# Patient Record
Sex: MALE | Race: WHITE | NOT HISPANIC OR LATINO | URBAN - METROPOLITAN AREA
[De-identification: names, ages, dates, MRNs, and addresses within clinical notes are randomized per-mention and may not be internally consistent; named-entity substitution may affect disease eponyms.]

---

## 2023-02-03 ENCOUNTER — INPATIENT (INPATIENT)
Facility: HOSPITAL | Age: 78
LOS: 5 days | Discharge: HOME | DRG: 392 | End: 2023-02-09
Attending: STUDENT IN AN ORGANIZED HEALTH CARE EDUCATION/TRAINING PROGRAM | Admitting: INTERNAL MEDICINE
Payer: MEDICARE

## 2023-02-03 VITALS
SYSTOLIC BLOOD PRESSURE: 141 MMHG | TEMPERATURE: 100 F | RESPIRATION RATE: 19 BRPM | OXYGEN SATURATION: 95 % | WEIGHT: 210.1 LBS | DIASTOLIC BLOOD PRESSURE: 84 MMHG | HEART RATE: 89 BPM | HEIGHT: 73 IN

## 2023-02-03 DIAGNOSIS — R62.50 UNSPECIFIED LACK OF EXPECTED NORMAL PHYSIOLOGICAL DEVELOPMENT IN CHILDHOOD: ICD-10-CM

## 2023-02-03 DIAGNOSIS — E87.20 ACIDOSIS, UNSPECIFIED: ICD-10-CM

## 2023-02-03 DIAGNOSIS — J45.909 UNSPECIFIED ASTHMA, UNCOMPLICATED: ICD-10-CM

## 2023-02-03 DIAGNOSIS — Z29.9 ENCOUNTER FOR PROPHYLACTIC MEASURES, UNSPECIFIED: ICD-10-CM

## 2023-02-03 DIAGNOSIS — R93.89 ABNORMAL FINDINGS ON DIAGNOSTIC IMAGING OF OTHER SPECIFIED BODY STRUCTURES: ICD-10-CM

## 2023-02-03 DIAGNOSIS — N40.0 BENIGN PROSTATIC HYPERPLASIA WITHOUT LOWER URINARY TRACT SYMPTOMS: ICD-10-CM

## 2023-02-03 DIAGNOSIS — E87.6 HYPOKALEMIA: ICD-10-CM

## 2023-02-03 DIAGNOSIS — K29.70 GASTRITIS, UNSPECIFIED, WITHOUT BLEEDING: ICD-10-CM

## 2023-02-03 LAB
ADV 40+41 DNA STL QL NAA+NON-PROBE: SIGNIFICANT CHANGE UP
ALBUMIN SERPL ELPH-MCNC: 3.6 G/DL — SIGNIFICANT CHANGE UP (ref 3.3–5)
ALP SERPL-CCNC: 72 U/L — SIGNIFICANT CHANGE UP (ref 40–120)
ALT FLD-CCNC: 16 U/L — SIGNIFICANT CHANGE UP (ref 10–45)
ANION GAP SERPL CALC-SCNC: 14 MMOL/L — SIGNIFICANT CHANGE UP (ref 5–17)
ANION GAP SERPL CALC-SCNC: 6 MMOL/L — SIGNIFICANT CHANGE UP (ref 5–17)
APTT BLD: 29.3 SEC — SIGNIFICANT CHANGE UP (ref 27.5–35.5)
AST SERPL-CCNC: 19 U/L — SIGNIFICANT CHANGE UP (ref 10–40)
ASTROVIRUS: SIGNIFICANT CHANGE UP
BASE EXCESS BLDV CALC-SCNC: -3.3 MMOL/L — LOW (ref -2–3)
BASOPHILS # BLD AUTO: 0 K/UL — SIGNIFICANT CHANGE UP (ref 0–0.2)
BASOPHILS NFR BLD AUTO: 0 % — SIGNIFICANT CHANGE UP (ref 0–2)
BILIRUB SERPL-MCNC: 0.6 MG/DL — SIGNIFICANT CHANGE UP (ref 0.2–1.2)
BLD GP AB SCN SERPL QL: NEGATIVE — SIGNIFICANT CHANGE UP
BUN SERPL-MCNC: 27 MG/DL — HIGH (ref 7–23)
BUN SERPL-MCNC: 33 MG/DL — HIGH (ref 7–23)
C CAYETANENSIS DNA STL QL NAA+NON-PROBE: SIGNIFICANT CHANGE UP
C DIFF GDH STL QL: NEGATIVE — SIGNIFICANT CHANGE UP
C DIFF GDH STL QL: SIGNIFICANT CHANGE UP
CA-I SERPL-SCNC: 1.07 MMOL/L — LOW (ref 1.15–1.33)
CALCIUM SERPL-MCNC: 7.4 MG/DL — LOW (ref 8.4–10.5)
CALCIUM SERPL-MCNC: 8.1 MG/DL — LOW (ref 8.4–10.5)
CAMPYLOBACTER DNA SPEC NAA+PROBE: SIGNIFICANT CHANGE UP
CHLORIDE SERPL-SCNC: 101 MMOL/L — SIGNIFICANT CHANGE UP (ref 96–108)
CHLORIDE SERPL-SCNC: 103 MMOL/L — SIGNIFICANT CHANGE UP (ref 96–108)
CO2 BLDV-SCNC: 22.4 MMOL/L — SIGNIFICANT CHANGE UP (ref 22–26)
CO2 SERPL-SCNC: 20 MMOL/L — LOW (ref 22–31)
CO2 SERPL-SCNC: 24 MMOL/L — SIGNIFICANT CHANGE UP (ref 22–31)
CREAT SERPL-MCNC: 0.71 MG/DL — SIGNIFICANT CHANGE UP (ref 0.5–1.3)
CREAT SERPL-MCNC: 0.92 MG/DL — SIGNIFICANT CHANGE UP (ref 0.5–1.3)
CRYPTOSP DNA STL QL NAA+PROBE: SIGNIFICANT CHANGE UP
E COLI SXT SPEC: SIGNIFICANT CHANGE UP
E HISTOLYT DNA STL QL NAA+NON-PROBE: SIGNIFICANT CHANGE UP
EC EAEA GENE STL QL NAA+PROBE: SIGNIFICANT CHANGE UP
EGFR: 85 ML/MIN/1.73M2 — SIGNIFICANT CHANGE UP
EGFR: 94 ML/MIN/1.73M2 — SIGNIFICANT CHANGE UP
EOSINOPHIL # BLD AUTO: 0 K/UL — SIGNIFICANT CHANGE UP (ref 0–0.5)
EOSINOPHIL NFR BLD AUTO: 0 % — SIGNIFICANT CHANGE UP (ref 0–6)
EPEC DNA STL QL NAA+PROBE: SIGNIFICANT CHANGE UP
ETEC DNA STL QL NAA+PROBE: SIGNIFICANT CHANGE UP
FLUAV AG NPH QL: SIGNIFICANT CHANGE UP
FLUBV AG NPH QL: SIGNIFICANT CHANGE UP
G LAMBLIA DNA STL QL NAA+NON-PROBE: SIGNIFICANT CHANGE UP
GAS PNL BLDV: 134 MMOL/L — LOW (ref 136–145)
GAS PNL BLDV: SIGNIFICANT CHANGE UP
GI PCR PANEL: DETECTED
GIANT PLATELETS BLD QL SMEAR: PRESENT — SIGNIFICANT CHANGE UP
GLUCOSE SERPL-MCNC: 104 MG/DL — HIGH (ref 70–99)
GLUCOSE SERPL-MCNC: 188 MG/DL — HIGH (ref 70–99)
HCO3 BLDV-SCNC: 21 MMOL/L — LOW (ref 22–29)
HCT VFR BLD CALC: 40.7 % — SIGNIFICANT CHANGE UP (ref 39–50)
HGB BLD-MCNC: 14.1 G/DL — SIGNIFICANT CHANGE UP (ref 13–17)
INR BLD: 1.19 — HIGH (ref 0.88–1.16)
LACTATE SERPL-SCNC: 2 MMOL/L — SIGNIFICANT CHANGE UP (ref 0.5–2)
LIDOCAIN IGE QN: 13 U/L — SIGNIFICANT CHANGE UP (ref 7–60)
LYMPHOCYTES # BLD AUTO: 0.12 K/UL — LOW (ref 1–3.3)
LYMPHOCYTES # BLD AUTO: 2.6 % — LOW (ref 13–44)
MAGNESIUM SERPL-MCNC: 1.7 MG/DL — SIGNIFICANT CHANGE UP (ref 1.6–2.6)
MANUAL SMEAR VERIFICATION: SIGNIFICANT CHANGE UP
MCHC RBC-ENTMCNC: 33.8 PG — SIGNIFICANT CHANGE UP (ref 27–34)
MCHC RBC-ENTMCNC: 34.6 GM/DL — SIGNIFICANT CHANGE UP (ref 32–36)
MCV RBC AUTO: 97.6 FL — SIGNIFICANT CHANGE UP (ref 80–100)
MONOCYTES # BLD AUTO: 0.08 K/UL — SIGNIFICANT CHANGE UP (ref 0–0.9)
MONOCYTES NFR BLD AUTO: 1.8 % — LOW (ref 2–14)
NEUTROPHILS # BLD AUTO: 4.38 K/UL — SIGNIFICANT CHANGE UP (ref 1.8–7.4)
NEUTROPHILS NFR BLD AUTO: 84.2 % — HIGH (ref 43–77)
NEUTS BAND # BLD: 9.6 % — HIGH (ref 0–8)
NOROVIRUS GI+II RNA STL QL NAA+NON-PROBE: DETECTED
NT-PROBNP SERPL-SCNC: 478 PG/ML — HIGH (ref 0–300)
P SHIGELLOIDES DNA STL QL NAA+NON-PROBE: SIGNIFICANT CHANGE UP
PCO2 BLDV: 36 MMHG — LOW (ref 42–55)
PH BLDV: 7.38 — SIGNIFICANT CHANGE UP (ref 7.32–7.43)
PLAT MORPH BLD: NORMAL — SIGNIFICANT CHANGE UP
PLATELET # BLD AUTO: 163 K/UL — SIGNIFICANT CHANGE UP (ref 150–400)
PO2 BLDV: 50 MMHG — HIGH (ref 25–45)
POTASSIUM BLDV-SCNC: 2.9 MMOL/L — CRITICAL LOW (ref 3.5–5.1)
POTASSIUM SERPL-MCNC: 3 MMOL/L — LOW (ref 3.5–5.3)
POTASSIUM SERPL-MCNC: 3.2 MMOL/L — LOW (ref 3.5–5.3)
POTASSIUM SERPL-SCNC: 3 MMOL/L — LOW (ref 3.5–5.3)
POTASSIUM SERPL-SCNC: 3.2 MMOL/L — LOW (ref 3.5–5.3)
PROT SERPL-MCNC: 7.3 G/DL — SIGNIFICANT CHANGE UP (ref 6–8.3)
PROTHROM AB SERPL-ACNC: 14.2 SEC — HIGH (ref 10.5–13.4)
RAPID RVP RESULT: SIGNIFICANT CHANGE UP
RBC # BLD: 4.17 M/UL — LOW (ref 4.2–5.8)
RBC # FLD: 12.8 % — SIGNIFICANT CHANGE UP (ref 10.3–14.5)
RBC BLD AUTO: NORMAL — SIGNIFICANT CHANGE UP
RH IG SCN BLD-IMP: POSITIVE — SIGNIFICANT CHANGE UP
RSV RNA NPH QL NAA+NON-PROBE: SIGNIFICANT CHANGE UP
RV RNA STL NAA+PROBE: SIGNIFICANT CHANGE UP
SALMONELLA DNA STL QL NAA+PROBE: SIGNIFICANT CHANGE UP
SAO2 % BLDV: 78 % — SIGNIFICANT CHANGE UP (ref 67–88)
SAPOVIRUS (GENOGROUPS I, II, IV, AND V): SIGNIFICANT CHANGE UP
SARS-COV-2 RNA SPEC QL NAA+PROBE: SIGNIFICANT CHANGE UP
SARS-COV-2 RNA SPEC QL NAA+PROBE: SIGNIFICANT CHANGE UP
SHIGELLA DNA SPEC QL NAA+PROBE: SIGNIFICANT CHANGE UP
SODIUM SERPL-SCNC: 133 MMOL/L — LOW (ref 135–145)
SODIUM SERPL-SCNC: 135 MMOL/L — SIGNIFICANT CHANGE UP (ref 135–145)
TROPONIN T SERPL-MCNC: <0.01 NG/ML — SIGNIFICANT CHANGE UP (ref 0–0.01)
VARIANT LYMPHS # BLD: 1.8 % — SIGNIFICANT CHANGE UP (ref 0–6)
VIBRIO CHOL TOXIN CTXA STL QL NAA+PROBE: SIGNIFICANT CHANGE UP
VIBRIO CHOL TOXIN CTXA STL QL NAA+PROBE: SIGNIFICANT CHANGE UP
WBC # BLD: 4.67 K/UL — SIGNIFICANT CHANGE UP (ref 3.8–10.5)
WBC # FLD AUTO: 4.67 K/UL — SIGNIFICANT CHANGE UP (ref 3.8–10.5)
Y ENTEROCOL DNA STL QL NAA+NON-PROBE: SIGNIFICANT CHANGE UP

## 2023-02-03 PROCEDURE — 71260 CT THORAX DX C+: CPT | Mod: 26

## 2023-02-03 PROCEDURE — 71045 X-RAY EXAM CHEST 1 VIEW: CPT | Mod: 26

## 2023-02-03 PROCEDURE — 99223 1ST HOSP IP/OBS HIGH 75: CPT

## 2023-02-03 PROCEDURE — 99222 1ST HOSP IP/OBS MODERATE 55: CPT | Mod: GC

## 2023-02-03 PROCEDURE — 99285 EMERGENCY DEPT VISIT HI MDM: CPT

## 2023-02-03 PROCEDURE — 74177 CT ABD & PELVIS W/CONTRAST: CPT | Mod: 26,MA

## 2023-02-03 RX ORDER — MULTIVIT-MIN/FERROUS GLUCONATE 9 MG/15 ML
1 LIQUID (ML) ORAL EVERY 24 HOURS
Refills: 0 | Status: DISCONTINUED | OUTPATIENT
Start: 2023-02-03 | End: 2023-02-09

## 2023-02-03 RX ORDER — DIATRIZOATE MEGLUMINE 180 MG/ML
30 INJECTION, SOLUTION INTRAVESICAL ONCE
Refills: 0 | Status: COMPLETED | OUTPATIENT
Start: 2023-02-03 | End: 2023-02-03

## 2023-02-03 RX ORDER — ALBUTEROL 90 UG/1
2 AEROSOL, METERED ORAL EVERY 6 HOURS
Refills: 0 | Status: DISCONTINUED | OUTPATIENT
Start: 2023-02-03 | End: 2023-02-09

## 2023-02-03 RX ORDER — ONDANSETRON 8 MG/1
4 TABLET, FILM COATED ORAL ONCE
Refills: 0 | Status: COMPLETED | OUTPATIENT
Start: 2023-02-03 | End: 2023-02-03

## 2023-02-03 RX ORDER — SODIUM CHLORIDE 9 MG/ML
1000 INJECTION INTRAMUSCULAR; INTRAVENOUS; SUBCUTANEOUS ONCE
Refills: 0 | Status: COMPLETED | OUTPATIENT
Start: 2023-02-03 | End: 2023-02-03

## 2023-02-03 RX ORDER — TRAZODONE HCL 50 MG
50 TABLET ORAL AT BEDTIME
Refills: 0 | Status: DISCONTINUED | OUTPATIENT
Start: 2023-02-03 | End: 2023-02-09

## 2023-02-03 RX ORDER — SODIUM CHLORIDE 9 MG/ML
1000 INJECTION, SOLUTION INTRAVENOUS
Refills: 0 | Status: DISCONTINUED | OUTPATIENT
Start: 2023-02-03 | End: 2023-02-04

## 2023-02-03 RX ORDER — MONTELUKAST 4 MG/1
10 TABLET, CHEWABLE ORAL EVERY 24 HOURS
Refills: 0 | Status: DISCONTINUED | OUTPATIENT
Start: 2023-02-03 | End: 2023-02-09

## 2023-02-03 RX ORDER — ASPIRIN/CALCIUM CARB/MAGNESIUM 324 MG
81 TABLET ORAL DAILY
Refills: 0 | Status: DISCONTINUED | OUTPATIENT
Start: 2023-02-03 | End: 2023-02-09

## 2023-02-03 RX ORDER — TAMSULOSIN HYDROCHLORIDE 0.4 MG/1
0.4 CAPSULE ORAL AT BEDTIME
Refills: 0 | Status: DISCONTINUED | OUTPATIENT
Start: 2023-02-03 | End: 2023-02-09

## 2023-02-03 RX ORDER — LANOLIN ALCOHOL/MO/W.PET/CERES
3 CREAM (GRAM) TOPICAL AT BEDTIME
Refills: 0 | Status: DISCONTINUED | OUTPATIENT
Start: 2023-02-03 | End: 2023-02-03

## 2023-02-03 RX ORDER — ONDANSETRON 8 MG/1
4 TABLET, FILM COATED ORAL EVERY 8 HOURS
Refills: 0 | Status: DISCONTINUED | OUTPATIENT
Start: 2023-02-03 | End: 2023-02-03

## 2023-02-03 RX ORDER — FLUTICASONE PROPIONATE 50 MCG
1 SPRAY, SUSPENSION NASAL EVERY 12 HOURS
Refills: 0 | Status: DISCONTINUED | OUTPATIENT
Start: 2023-02-03 | End: 2023-02-09

## 2023-02-03 RX ORDER — CHLORHEXIDINE GLUCONATE 213 G/1000ML
15 SOLUTION TOPICAL
Refills: 0 | Status: DISCONTINUED | OUTPATIENT
Start: 2023-02-03 | End: 2023-02-09

## 2023-02-03 RX ORDER — POLYETHYLENE GLYCOL 3350 17 G/17G
17 POWDER, FOR SOLUTION ORAL EVERY 24 HOURS
Refills: 0 | Status: DISCONTINUED | OUTPATIENT
Start: 2023-02-03 | End: 2023-02-03

## 2023-02-03 RX ORDER — POTASSIUM CHLORIDE 20 MEQ
40 PACKET (EA) ORAL EVERY 4 HOURS
Refills: 0 | Status: COMPLETED | OUTPATIENT
Start: 2023-02-03 | End: 2023-02-03

## 2023-02-03 RX ORDER — FLUPHENAZINE HYDROCHLORIDE 1 MG/1
2.5 TABLET, FILM COATED ORAL EVERY 12 HOURS
Refills: 0 | Status: DISCONTINUED | OUTPATIENT
Start: 2023-02-03 | End: 2023-02-09

## 2023-02-03 RX ORDER — POTASSIUM CHLORIDE 20 MEQ
40 PACKET (EA) ORAL ONCE
Refills: 0 | Status: COMPLETED | OUTPATIENT
Start: 2023-02-03 | End: 2023-02-03

## 2023-02-03 RX ORDER — ENOXAPARIN SODIUM 100 MG/ML
40 INJECTION SUBCUTANEOUS EVERY 24 HOURS
Refills: 0 | Status: DISCONTINUED | OUTPATIENT
Start: 2023-02-03 | End: 2023-02-09

## 2023-02-03 RX ORDER — ACETAMINOPHEN 500 MG
650 TABLET ORAL EVERY 6 HOURS
Refills: 0 | Status: DISCONTINUED | OUTPATIENT
Start: 2023-02-03 | End: 2023-02-09

## 2023-02-03 RX ORDER — SENNA PLUS 8.6 MG/1
2 TABLET ORAL AT BEDTIME
Refills: 0 | Status: DISCONTINUED | OUTPATIENT
Start: 2023-02-03 | End: 2023-02-03

## 2023-02-03 RX ORDER — POTASSIUM CHLORIDE 20 MEQ
10 PACKET (EA) ORAL
Refills: 0 | Status: COMPLETED | OUTPATIENT
Start: 2023-02-03 | End: 2023-02-03

## 2023-02-03 RX ORDER — MAGNESIUM SULFATE 500 MG/ML
2 VIAL (ML) INJECTION ONCE
Refills: 0 | Status: COMPLETED | OUTPATIENT
Start: 2023-02-03 | End: 2023-02-03

## 2023-02-03 RX ORDER — FINASTERIDE 5 MG/1
5 TABLET, FILM COATED ORAL EVERY 24 HOURS
Refills: 0 | Status: DISCONTINUED | OUTPATIENT
Start: 2023-02-03 | End: 2023-02-09

## 2023-02-03 RX ADMIN — Medication 10 MILLIEQUIVALENT(S): at 06:30

## 2023-02-03 RX ADMIN — Medication 50 MILLIGRAM(S): at 22:40

## 2023-02-03 RX ADMIN — FLUPHENAZINE HYDROCHLORIDE 2.5 MILLIGRAM(S): 1 TABLET, FILM COATED ORAL at 10:02

## 2023-02-03 RX ADMIN — Medication 25 GRAM(S): at 05:10

## 2023-02-03 RX ADMIN — FINASTERIDE 5 MILLIGRAM(S): 5 TABLET, FILM COATED ORAL at 10:03

## 2023-02-03 RX ADMIN — Medication 100 MILLIEQUIVALENT(S): at 07:49

## 2023-02-03 RX ADMIN — Medication 1 SPRAY(S): at 10:03

## 2023-02-03 RX ADMIN — Medication 1 DROP(S): at 10:03

## 2023-02-03 RX ADMIN — FLUPHENAZINE HYDROCHLORIDE 2.5 MILLIGRAM(S): 1 TABLET, FILM COATED ORAL at 22:40

## 2023-02-03 RX ADMIN — SODIUM CHLORIDE 80 MILLILITER(S): 9 INJECTION, SOLUTION INTRAVENOUS at 10:04

## 2023-02-03 RX ADMIN — TAMSULOSIN HYDROCHLORIDE 0.4 MILLIGRAM(S): 0.4 CAPSULE ORAL at 22:40

## 2023-02-03 RX ADMIN — Medication 81 MILLIGRAM(S): at 13:20

## 2023-02-03 RX ADMIN — Medication 100 MILLIEQUIVALENT(S): at 06:30

## 2023-02-03 RX ADMIN — Medication 1 DROP(S): at 22:40

## 2023-02-03 RX ADMIN — Medication 40 MILLIEQUIVALENT(S): at 22:40

## 2023-02-03 RX ADMIN — CHLORHEXIDINE GLUCONATE 15 MILLILITER(S): 213 SOLUTION TOPICAL at 17:09

## 2023-02-03 RX ADMIN — Medication 1 SPRAY(S): at 22:40

## 2023-02-03 RX ADMIN — Medication 100 MILLIEQUIVALENT(S): at 05:10

## 2023-02-03 RX ADMIN — MONTELUKAST 10 MILLIGRAM(S): 4 TABLET, CHEWABLE ORAL at 10:03

## 2023-02-03 RX ADMIN — SODIUM CHLORIDE 1000 MILLILITER(S): 9 INJECTION INTRAMUSCULAR; INTRAVENOUS; SUBCUTANEOUS at 05:10

## 2023-02-03 RX ADMIN — Medication 40 MILLIEQUIVALENT(S): at 05:10

## 2023-02-03 RX ADMIN — Medication 40 MILLIEQUIVALENT(S): at 17:09

## 2023-02-03 RX ADMIN — Medication 1 TABLET(S): at 10:03

## 2023-02-03 RX ADMIN — SODIUM CHLORIDE 1000 MILLILITER(S): 9 INJECTION INTRAMUSCULAR; INTRAVENOUS; SUBCUTANEOUS at 04:28

## 2023-02-03 RX ADMIN — ENOXAPARIN SODIUM 40 MILLIGRAM(S): 100 INJECTION SUBCUTANEOUS at 10:03

## 2023-02-03 RX ADMIN — ONDANSETRON 4 MILLIGRAM(S): 8 TABLET, FILM COATED ORAL at 04:28

## 2023-02-03 RX ADMIN — DIATRIZOATE MEGLUMINE 30 MILLILITER(S): 180 INJECTION, SOLUTION INTRAVESICAL at 04:28

## 2023-02-03 NOTE — ED ADULT NURSE REASSESSMENT NOTE - NS ED NURSE REASSESS COMMENT FT1
Pt cleaned x 2 by RN's. GI at bedside, stool sample collected.
Pt noted to be soiled, cleaned by PCA's prior to transfer to floor.
Received report from FERN Estrada. Received patient in stretcher. AOX0. Vital signs as noted in flowsheet.  Nonverbal s/sx of pain/discomfort not noted. Plan of care discussed and verbalized understanding. All needs attended. Fall risk precautions maintained. Purposeful proactive hourly rounding in progress.

## 2023-02-03 NOTE — H&P ADULT - HISTORY OF PRESENT ILLNESS
77M hx asthma, developmental delay (nonverbal at baseline), BPH, glaucoma, BIBEMS for nonbloody diarrhea, vomiting, and respiratory distress since yesterday.  EMS reports upon their arrival he was 89% on RA, 92% on NC. placed pt on NRB. Per pt's escort from facility, symptoms began yesterday. States that 2 other people at facility have similar symptoms. Escort states that at baseline pt is unable to communicate pain to staff.  ROS otherwise unobtainable due to mental status but patient appears comfortable, breathing without accessory muscle use or cough.    ED course:  Vitals: T 99.9 F, HR 89, /84, rr 19, SpO2 95% on NRB -->97% on 4L NC  Labs: CBC wnl with 9.6% band neutrophils, PTT 14.2, INR 1.19, K 3.0, HCO3 20, BUN 33, glucose 188, Ca 8.1  Lactate 2.0, lipase 13, Mg 1.7, Trop <0.01,   VBG: K 2.9, Na 134, Ca 1.07, pCO2 36, pO2 50, HCO3 21, O2 sat 78%  COVID/Flu/RSV negative  CT A/P w oral and IV con prelim report shows: small hiatal hernia, suspicious for cecal carcinoma, ileus with findings c/w history of diarrhea.  Interventions: Zofran 4mg x1, MgSO4 2g x1, KCl 40 mEq po & 30 mEq IV, NS 2L bolus    Patient admitted to Medicine for further management. 77M hx asthma, developmental delay (nonverbal at baseline), BPH, glaucoma, BIBEMS for nonbloody diarrhea, vomiting, and respiratory distress since yesterday.  EMS reports upon their arrival he was 89% on RA, 92% on NC. placed pt on NRB. Per pt's escort from facility, symptoms began yesterday. States that 2 other people at facility have similar symptoms. Escort states that at baseline pt is unable to communicate pain to staff.  ROS otherwise unobtainable due to mental status but patient appears comfortable, breathing without accessory muscle use or cough.    ED course:  Vitals: T 99.9 F, HR 89, /84, rr 19, SpO2 95% on NRB -->97% on 4L NC  Labs: CBC wnl with 9.6% band neutrophils, PTT 14.2, INR 1.19, K 3.0, HCO3 20, BUN 33, glucose 188, Ca 8.1  Lactate 2.0, lipase 13, Mg 1.7, Trop <0.01,   VBG: K 2.9, Na 134, Ca 1.07, pCO2 36, pO2 50, HCO3 21, O2 sat 78%  COVID/Flu/RSV negative  ECG: NSR@92 bpm, normal axis, QTc 437, no ST-T changes  CT A/P w oral and IV con prelim report shows: small hiatal hernia, suspicious for cecal carcinoma, ileus with findings c/w history of diarrhea.  Interventions: Zofran 4mg x1, MgSO4 2g x1, KCl 40 mEq po & 30 mEq IV, NS 2L bolus    Patient admitted to Medicine for further management.

## 2023-02-03 NOTE — H&P ADULT - NSHPSOCIALHISTORY_GEN_ALL_CORE
Per escort, no tobacco, alcohol or drug use. Ambulates without assistive devices at baseline but hasn't been walking since his episodes of diarrhea last night.  Lives at [facility].

## 2023-02-03 NOTE — H&P ADULT - PROBLEM SELECTOR PLAN 3
Patient with O2 requirements on admission, initially requiring 15L NRB and weaned to 4L NC. Patient weaned to room air and satting 95%. No wheezing noted on exam.  - continue home fluticasone nasal 50 mcg qd  - albuterol HFA q6h prn for SOB/wheezing

## 2023-02-03 NOTE — H&P ADULT - PROBLEM SELECTOR PLAN 1
Patient with 1 day of nonbloody vomiting and diarrhea, history of similar symptoms with group home residents. Patient not meeting sepsis criteria.  CT A/P showed ileus with findings c/w history of diarrhea.  - f/u GI PCR  - f/u blood cultures Patient with 1 day of nonbloody vomiting and diarrhea, history of similar symptoms with group home residents. Patient not meeting sepsis criteria.  CT A/P showed ileus with findings c/w history of diarrhea. CT with significant stool burden.  - f/u GI PCR  - f/u blood cultures  - bowel regimen

## 2023-02-03 NOTE — H&P ADULT - PROBLEM SELECTOR PLAN 8
F: s/p NS 2L, LR @80/hr x6 hours  E: replenish K<4, Mg<2  N: Regular    VTE Prophylaxis: Lovenox 40 Q24H  GI: not needed  C: Full Code  D: SILVER

## 2023-02-03 NOTE — ED PROVIDER NOTE - NSFOLLOWUPINSTRUCTIONS_ED_ALL_ED_FT
Nausea and Vomiting, Adult    Nausea is feeling that you have an upset stomach and that you are about to vomit. Vomiting is when food in your stomach forcefully comes out of your mouth. Vomiting can make you feel weak. If you vomit, or if you are not able to drink enough fluids, you may not have enough water in your body (get dehydrated). If you do not have enough water in your body, you may:  •Feel tired.     •Feel thirsty.     •Have a dry mouth.    •Have cracked lips.    •Pee (urinate) less often.    Older adults and people with other diseases or a weak body defense system (immune system) are at higher risk for not having enough water in the body. If you feel like you may vomit or you vomit, it is important to follow instructions from your doctor about how to take care of yourself.    Follow these instructions at home:    Watch your symptoms for any changes. Tell your doctor about them.    Eating and drinking     •Take an ORS (oral rehydration solution). This is a drink that is sold at pharmacies and stores.    •Drink clear fluids in small amounts as you are able, such as:  •Water.    •Ice chips.    •Fruit juice that has water added (diluted fruit juice).    •Low-calorie sports drinks.    •Eat bland, easy-to-digest foods in small amounts as you are able, such as:  •Bananas.    •Applesauce.    •Rice.    •Low-fat (lean) meats.    •Toast.    •Crackers.    •Avoid drinking fluids that have a lot of sugar or caffeine in them. This includes energy drinks, sports drinks, and soda.    •Avoid alcohol.    •Avoid spicy or fatty foods.    General instructions     •Take over-the-counter and prescription medicines only as told by your doctor.    •Drink enough fluid to keep your pee (urine) pale yellow.    •Wash your hands often with soap and water for at least 20 seconds. If you cannot use soap and water, use hand .    •Make sure that everyone in your home washes their hands well and often.    •Rest at home until you feel better.    •Watch your condition for any changes.    •Take slow and deep breaths when you feel like you may vomit.    •Keep all follow-up visits.    Contact a doctor if:    •Your symptoms get worse.    •You have new symptoms.    •You have a fever.    •You cannot drink fluids without vomiting.    •You feel like you may vomit for more than 2 days.    •You feel light-headed or dizzy.    •You have a headache.    •You have muscle cramps.    •You have a rash.    •You have pain while peeing.    Get help right away if:    •You have pain in your chest, neck, arm, or jaw.    •You feel very weak or you faint.    •You vomit again and again.    •You have vomit that is bright red or looks like black coffee grounds.    •You have bloody or black poop (stools) or poop that looks like tar.    •You have a very bad headache, a stiff neck, or both.    •You have very bad pain, cramping, or bloating in your belly (abdomen).    •You have trouble breathing.    •You are breathing very quickly.    •Your heart is beating very quickly.    •Your skin feels cold and clammy.    •You feel confused.    •You have signs of losing too much water in your body, such as:  •Dark pee, very little pee, or no pee.    •Cracked lips.    •Dry mouth.    •Sunken eyes.    •Sleepiness.    •Weakness.    These symptoms may be an emergency. Get help right away. Call 911.   • Do not wait to see if the symptoms will go away.     • Do not drive yourself to the hospital.     Summary    •Nausea is feeling that you have an upset stomach and that you are about to vomit. Vomiting is when food in your stomach comes out of your mouth.    •Follow instructions from your doctor about eating and drinking.    •Take over-the-counter and prescription medicines only as told by your doctor.    •Contact your doctor if your symptoms get worse or you have new symptoms.    •Keep all follow-up visits.    This information is not intended to replace advice given to you by your health

## 2023-02-03 NOTE — H&P ADULT - NSHPPHYSICALEXAM_GEN_ALL_CORE
PHYSICAL EXAM:  Constitutional: Resting comfortably in bed; NAD  Head: NC/AT  Eyes: PERRL, EOMI, anicteric sclera  ENT: no nasal discharge; uvula midline; dry mucous membranes  Neck: supple; no JVD or thyromegaly  Respiratory: CTA B/L; no W/R/R, no retractions  Cardiac: +S1/S2; RRR; no M/R/G  Gastrointestinal: abdomen soft, NTND; +BSx4  Genitourinary: normal external genitalia, condom cath noted draining clear yellow urine  Extremities: WWP, no clubbing or cyanosis; no peripheral edema  Musculoskeletal: NROM x4; no joint swelling, tenderness or erythema  Vascular: 2+ radial, DP/PT pulses B/L  Dermatologic: skin warm, dry and intact; mild excoriations noted on arms and legs (patient reportedly scratches self)  Lymphatic: no submandibular or cervical LAD  Neurologic: AAOx0; CNII-XII grossly intact; no focal deficits  Psychiatric: nonverbal, follows simple commands, compliant with exam

## 2023-02-03 NOTE — H&P ADULT - PROBLEM SELECTOR PLAN 6
Admission K 3.0. ECG with no T wave changes, no U waves. s/p KCl 40 mEq po & 30 mEq IV in ED  - trend BMP    #hypomagnesemia  Admission Mg 1.7. s/p MgSO4 2g x1  - trend BMP

## 2023-02-03 NOTE — ED PROVIDER NOTE - CARE PLAN
Principal Discharge DX:	Nausea, vomiting and diarrhea  Secondary Diagnosis:	Hypokalemia   1 Principal Discharge DX:	Nausea, vomiting and diarrhea  Secondary Diagnosis:	Hypokalemia  Secondary Diagnosis:	Ileus

## 2023-02-03 NOTE — H&P ADULT - PROBLEM SELECTOR PLAN 4
Patient with chronic developmental delay, nonverbal at baseline, lives in group home for patients with developmental delay. Home medications trifluoperazine 5 mg po qd, trazodone 50 mg po qhs. Follows simple commands.  - continue home meds Patient with chronic developmental delay, nonverbal at baseline, lives in group home for patients with developmental delay. Home medications trifluoperazine 5 mg po qd, trazodone 50 mg po qhs. Follows simple commands.  - continue home trazodone  - fluphenazine 2.5 mg po bid (therapeutic interchange)

## 2023-02-03 NOTE — PATIENT PROFILE ADULT - FALL HARM RISK - HARM RISK INTERVENTIONS

## 2023-02-03 NOTE — ED PROVIDER NOTE - PROGRESS NOTE DETAILS
will replete k and mg. pending CT results ileus on CT. concern for possible cecal carcinoma. escort unsure of who his health care proxy is  - will admit to medicine for  intervention to determine extent of intervention and care for possible CA will replete k and mg.

## 2023-02-03 NOTE — H&P ADULT - NSHPLABSRESULTS_GEN_ALL_CORE
LABS:                         14.1   4.67  )-----------( 163      ( 03 Feb 2023 04:24 )             40.7     02-03    135  |  101  |  33<H>  ----------------------------<  188<H>  3.0<L>   |  20<L>  |  0.92    Ca    8.1<L>      03 Feb 2023 04:24  Mg     1.7     02-03    TPro  7.3  /  Alb  3.6  /  TBili  0.6  /  DBili  x   /  AST  19  /  ALT  16  /  AlkPhos  72  02-03    PT/INR - ( 03 Feb 2023 04:24 )   PT: 14.2 sec;   INR: 1.19          PTT - ( 03 Feb 2023 04:24 )  PTT:29.3 sec    CARDIAC MARKERS ( 03 Feb 2023 04:24 )  x     / <0.01 ng/mL / x     / x     / x          Serum Pro-Brain Natriuretic Peptide: 478 pg/mL (02-03 @ 04:24)    Lactate, Blood: 2.0 mmol/L (02-03 @ 04:24)      RADIOLOGY, EKG & ADDITIONAL TESTS:

## 2023-02-03 NOTE — H&P ADULT - PROBLEM SELECTOR PLAN 5
Admission HCO3 20 with normal anion gap consistent with history of diarrhea. s/p 2L NS in ED. Dry mucous membranes on exam.  - gentle maintenance fluids  - encourage oral hydration  - trend BMP

## 2023-02-03 NOTE — ED PROVIDER NOTE - OBJECTIVE STATEMENT
77M hx asthma, developmental delay (nonverbal at baseline), BIBEMS for resp distress. per pt's escort from facility pt with nonbloody vomiting and diarrhea since yesterday. states 2 other people at facility with similar symptoms. tonight pt seemed to be having trouble breathing. no cough. EMS states upon their arrival he was 89% on RA, 92% on NC. placed pt on NRB. escort states pt unable to communicate pain to staff.

## 2023-02-03 NOTE — ED PROVIDER NOTE - CLINICAL SUMMARY MEDICAL DECISION MAKING FREE TEXT BOX
n/v/d, afebrile rectally, no resp distress currently, no wheezing. abd soft, no guarding on exam, no wincing with palpation.   -check labs  -ekg  -cxr  -CT a/p to evaluate for surgical pathology as pt with n/v/d and unable to communicate if had pain  -zofran, ivf  -cultures

## 2023-02-03 NOTE — H&P ADULT - ATTENDING COMMENTS
A/P:  1. N/V with SOB, likely viral etiology  2. hypokalemia due to GI losses from N/V  3. AGMA  4. incidental finding of cecal stricture    Stable conditions:  1. BPH  2. dementia related psychosis?    - labs reviewed, normal WBC but with neutrophil predominance and bands  - CT abdomen reviewed +liquid stool and cecal stricture  - obtain GI PCR  - replete electrolytes for goal potassium 4 and Mag 2. replete phos  - BRAT diet, low residue diet  - GI eval for cecal stricture -- likely will need outpatient colonoscopy. obtain collateral info re: prior colonoscopies  - continue finasteride and tamsulosin  - c/w fluphenazine and trazadone    rest of plan as above    plan for dc in next 24-48 hours if able to tolerate A/P:  1. N/V with SOB, likely viral etiology  2. hypokalemia due to GI losses from N/V  3. AGMA  4. incidental finding of cecal stricture    Stable conditions:  1. BPH  2. dementia related psychosis?  3. asthma, mild intermittent    - labs reviewed, normal WBC but with neutrophil predominance and bands  - CT abdomen reviewed +liquid stool and cecal stricture  - obtain GI PCR  - replete electrolytes for goal potassium 4 and Mag 2. replete phos  - BRAT diet, low residue diet  - GI eval for cecal stricture -- likely will need outpatient colonoscopy. obtain collateral info re: prior colonoscopies  - continue finasteride and tamsulosin  - c/w fluphenazine and trazadone    rest of plan as above    plan for dc in next 24-48 hours if able to tolerate po A/P:  1. N/V with SOB, likely viral etiology  2. hypokalemia due to GI losses from N/V  3. AGMA  4. incidental finding of cecal stricture    Stable conditions:  1. BPH  2. dementia related psychosis?  3. asthma, mild intermittent    - labs reviewed, normal WBC but with neutrophil predominance and bands  - CT abdomen reviewed +liquid stool and cecal stricture  - obtain GI PCR  - replete electrolytes for goal potassium 4 and Mag 2. replete phos  - BRAT diet, low residue diet  - GI eval for cecal stricture -- likely will need outpatient colonoscopy. obtain collateral info re: prior colonoscopies  - continue finasteride and tamsulosin  - c/w fluphenazine and trazadone    Edit: GI PCR + norovirus    rest of plan as above    plan for dc in next 24-48 hours if able to tolerate po

## 2023-02-03 NOTE — CONSULT NOTE ADULT - ASSESSMENT
77M hx  developmental delay (nonverbal at baseline) admitted for acute gastroenteritis without overt GIB incidentally found to have 4.5 cm long annular lesion involving the superior cecum concerning for malignancy    GI consulted given cecal lesion    #Cecal lesion:  No apparent acute GIB. Hgb nml.  Lesion appearance concerning for malignancy, though this is likely unrelated to admitting dx given what appears to be viral gastroenteritis outbreak at residential facility per collateral hx obtained    Recommendations:  -Obtain CEA  -Obtain CT chest to complete initial eval at time of recommended delayed CT abd/pelvis per radiology to assess for transit of contrast through small bowel  -If concern for obstruction, with worsening nausea/vomiting, consider NGT placement for decompression  -For now, diet per primary team pending above evaluation and tx of acute gastroenteritis  -Will plan to discuss concerns with pt's healthcare proxy to determine GOC and recommendation for colonoscopy for definitive eval and dx  -Defer colonoscopy for now in favor of medical mgmt of acute gastroenteritis though inpt evaluation warranted once clinically improved.    #Acute gastroenteritis:  Hemodynamically stable and afebrile. HypoK noted on admissions labs though nml renal function/liver enzymes.    Cdiff negative  GI PCR pending    Recommendations:  -F/u GI PCR  -Resuscitation/lyte replacement    Marcellus Husain DO  Gastroenterology Fellow  Pager: [phone]   77M hx  developmental delay (nonverbal at baseline) admitted for acute gastroenteritis without overt GIB incidentally found to have 4.5 cm long annular lesion involving the superior cecum concerning for malignancy    GI consulted given cecal lesion    #Cecal lesion:  No apparent acute GIB. Hgb nml.  Lesion appearance concerning for malignancy, though this is likely unrelated to admitting dx given what appears to be viral gastroenteritis outbreak at residential facility per collateral hx obtained    Recommendations:  -Obtain CEA  -Obtain CT chest to complete initial eval at time of recommended delayed CT abd/pelvis per radiology to assess for transit of contrast through small bowel  -If concern for obstruction, with worsening nausea/vomiting, consider NGT placement for decompression  -For now, diet per primary team pending above evaluation and tx of acute gastroenteritis  -Will plan to discuss concerns with pt's healthcare proxy to determine procedural consent in setting of recommendation for colonoscopy for definitive eval and dx  -Defer colonoscopy for now in favor of medical mgmt of acute gastroenteritis though inpt evaluation warranted once clinically improved.    #Acute gastroenteritis:  Hemodynamically stable and afebrile. HypoK noted on admissions labs though nml renal function/liver enzymes.    Cdiff negative  GI PCR pending    Recommendations:  -F/u GI PCR  -Resuscitation/lyte replacement    Marcellus Husain DO  Gastroenterology Fellow  Pager: [phone]   77M hx  developmental delay (nonverbal at baseline) admitted for acute gastroenteritis without overt GIB incidentally found to have 4.5 cm long annular lesion involving the superior cecum concerning for malignancy    GI consulted given cecal lesion    #Cecal lesion:  No apparent acute GIB. Hgb nml.  Lesion appearance concerning for malignancy, though this is likely unrelated to admitting dx given what appears to be viral gastroenteritis outbreak at residential facility per collateral hx obtained    Recommendations:  -Obtain CEA  -Obtain CT chest to complete initial eval at time of recommended delayed CT abd/pelvis per radiology to assess for transit of contrast through small bowel  -If concern for obstruction, with worsening nausea/vomiting, consider NGT placement for decompression  -For now, diet per primary team pending above evaluation and tx of acute gastroenteritis  -Will plan to discuss concerns with pt's healthcare proxy to determine procedural consent in setting of recommendation for colonoscopy for definitive eval and dx  -Defer colonoscopy for now in favor of medical mgmt of acute gastroenteritis though inpt evaluation warranted once clinically improved.    #Acute viral gastroenteritis:  Norovirus + on PCR  Hemodynamically stable and afebrile. HypoK noted on admissions labs though nml renal function/liver enzymes.    Cdiff negative    Recommendations:  -Resuscitation/lyte replacement  -Supportive care per primary team    Marcellus Husain DO  Gastroenterology Fellow  Pager: [phone]

## 2023-02-03 NOTE — H&P ADULT - ASSESSMENT
77M hx asthma, developmental delay (nonverbal at baseline), BPH, glaucoma, BIBEMS for nonbloody diarrhea, vomiting, and respiratory distress for 1 day, admitted for further management.

## 2023-02-03 NOTE — H&P ADULT - NSICDXPASTMEDICALHX_GEN_ALL_CORE_FT
PAST MEDICAL HISTORY:  Asthma     BPH (benign prostatic hyperplasia)     Glaucoma     History of developmental disability

## 2023-02-03 NOTE — ED ADULT TRIAGE NOTE - ARRIVAL INFO ADDITIONAL COMMENTS
Pt to ED c/o N/V/DX 2 days as well as SOB X3 hours. Non bloody stool/ emesis. HX of asthma. Per EMS 89 on room air. 95 on non rebreather. Equal b/l chest mikie noted. Protecting airway. Hx of intellectual disability.

## 2023-02-03 NOTE — H&P ADULT - PROBLEM SELECTOR PLAN 2
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 Escort notes slight weight loss in the patient in the past few months. Unknown colonoscopy history.  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 Escort notes slight weight loss in the patient in the past few months. Unknown colonoscopy history.  - GI consult

## 2023-02-04 ENCOUNTER — TRANSCRIPTION ENCOUNTER (OUTPATIENT)
Age: 78
End: 2023-02-04

## 2023-02-04 LAB
ALBUMIN SERPL ELPH-MCNC: 3 G/DL — LOW (ref 3.3–5)
ALP SERPL-CCNC: 60 U/L — SIGNIFICANT CHANGE UP (ref 40–120)
ALT FLD-CCNC: 20 U/L — SIGNIFICANT CHANGE UP (ref 10–45)
ANION GAP SERPL CALC-SCNC: 6 MMOL/L — SIGNIFICANT CHANGE UP (ref 5–17)
ANION GAP SERPL CALC-SCNC: 6 MMOL/L — SIGNIFICANT CHANGE UP (ref 5–17)
AST SERPL-CCNC: 29 U/L — SIGNIFICANT CHANGE UP (ref 10–40)
BASOPHILS # BLD AUTO: 0.01 K/UL — SIGNIFICANT CHANGE UP (ref 0–0.2)
BASOPHILS NFR BLD AUTO: 0.3 % — SIGNIFICANT CHANGE UP (ref 0–2)
BILIRUB SERPL-MCNC: 0.2 MG/DL — SIGNIFICANT CHANGE UP (ref 0.2–1.2)
BUN SERPL-MCNC: 20 MG/DL — SIGNIFICANT CHANGE UP (ref 7–23)
BUN SERPL-MCNC: 23 MG/DL — SIGNIFICANT CHANGE UP (ref 7–23)
CALCIUM SERPL-MCNC: 7.9 MG/DL — LOW (ref 8.4–10.5)
CALCIUM SERPL-MCNC: 8 MG/DL — LOW (ref 8.4–10.5)
CEA SERPL-MCNC: 2.7 NG/ML — SIGNIFICANT CHANGE UP (ref 0–3.8)
CHLORIDE SERPL-SCNC: 110 MMOL/L — HIGH (ref 96–108)
CHLORIDE SERPL-SCNC: 111 MMOL/L — HIGH (ref 96–108)
CO2 SERPL-SCNC: 23 MMOL/L — SIGNIFICANT CHANGE UP (ref 22–31)
CO2 SERPL-SCNC: 25 MMOL/L — SIGNIFICANT CHANGE UP (ref 22–31)
CREAT SERPL-MCNC: 0.73 MG/DL — SIGNIFICANT CHANGE UP (ref 0.5–1.3)
CREAT SERPL-MCNC: 0.77 MG/DL — SIGNIFICANT CHANGE UP (ref 0.5–1.3)
EGFR: 92 ML/MIN/1.73M2 — SIGNIFICANT CHANGE UP
EGFR: 93 ML/MIN/1.73M2 — SIGNIFICANT CHANGE UP
EOSINOPHIL # BLD AUTO: 0.05 K/UL — SIGNIFICANT CHANGE UP (ref 0–0.5)
EOSINOPHIL NFR BLD AUTO: 1.3 % — SIGNIFICANT CHANGE UP (ref 0–6)
GLUCOSE SERPL-MCNC: 124 MG/DL — HIGH (ref 70–99)
GLUCOSE SERPL-MCNC: 97 MG/DL — SIGNIFICANT CHANGE UP (ref 70–99)
HCT VFR BLD CALC: 35.2 % — LOW (ref 39–50)
HGB BLD-MCNC: 11.8 G/DL — LOW (ref 13–17)
IMM GRANULOCYTES NFR BLD AUTO: 0.3 % — SIGNIFICANT CHANGE UP (ref 0–0.9)
LYMPHOCYTES # BLD AUTO: 0.99 K/UL — LOW (ref 1–3.3)
LYMPHOCYTES # BLD AUTO: 26.4 % — SIGNIFICANT CHANGE UP (ref 13–44)
MAGNESIUM SERPL-MCNC: 2.5 MG/DL — SIGNIFICANT CHANGE UP (ref 1.6–2.6)
MCHC RBC-ENTMCNC: 32.8 PG — SIGNIFICANT CHANGE UP (ref 27–34)
MCHC RBC-ENTMCNC: 33.5 GM/DL — SIGNIFICANT CHANGE UP (ref 32–36)
MCV RBC AUTO: 97.8 FL — SIGNIFICANT CHANGE UP (ref 80–100)
MONOCYTES # BLD AUTO: 0.41 K/UL — SIGNIFICANT CHANGE UP (ref 0–0.9)
MONOCYTES NFR BLD AUTO: 10.9 % — SIGNIFICANT CHANGE UP (ref 2–14)
NEUTROPHILS # BLD AUTO: 2.28 K/UL — SIGNIFICANT CHANGE UP (ref 1.8–7.4)
NEUTROPHILS NFR BLD AUTO: 60.8 % — SIGNIFICANT CHANGE UP (ref 43–77)
NRBC # BLD: 0 /100 WBCS — SIGNIFICANT CHANGE UP (ref 0–0)
PHOSPHATE SERPL-MCNC: 1 MG/DL — CRITICAL LOW (ref 2.5–4.5)
PHOSPHATE SERPL-MCNC: 1.8 MG/DL — LOW (ref 2.5–4.5)
PLATELET # BLD AUTO: 132 K/UL — LOW (ref 150–400)
POTASSIUM SERPL-MCNC: 4.2 MMOL/L — SIGNIFICANT CHANGE UP (ref 3.5–5.3)
POTASSIUM SERPL-MCNC: 4.2 MMOL/L — SIGNIFICANT CHANGE UP (ref 3.5–5.3)
POTASSIUM SERPL-SCNC: 4.2 MMOL/L — SIGNIFICANT CHANGE UP (ref 3.5–5.3)
POTASSIUM SERPL-SCNC: 4.2 MMOL/L — SIGNIFICANT CHANGE UP (ref 3.5–5.3)
PROT SERPL-MCNC: 5.9 G/DL — LOW (ref 6–8.3)
RBC # BLD: 3.6 M/UL — LOW (ref 4.2–5.8)
RBC # FLD: 13.3 % — SIGNIFICANT CHANGE UP (ref 10.3–14.5)
SODIUM SERPL-SCNC: 140 MMOL/L — SIGNIFICANT CHANGE UP (ref 135–145)
SODIUM SERPL-SCNC: 141 MMOL/L — SIGNIFICANT CHANGE UP (ref 135–145)
WBC # BLD: 3.75 K/UL — LOW (ref 3.8–10.5)
WBC # FLD AUTO: 3.75 K/UL — LOW (ref 3.8–10.5)

## 2023-02-04 PROCEDURE — 99233 SBSQ HOSP IP/OBS HIGH 50: CPT

## 2023-02-04 PROCEDURE — 99231 SBSQ HOSP IP/OBS SF/LOW 25: CPT

## 2023-02-04 RX ORDER — POTASSIUM PHOSPHATE, MONOBASIC POTASSIUM PHOSPHATE, DIBASIC 236; 224 MG/ML; MG/ML
30 INJECTION, SOLUTION INTRAVENOUS ONCE
Refills: 0 | Status: COMPLETED | OUTPATIENT
Start: 2023-02-04 | End: 2023-02-04

## 2023-02-04 RX ORDER — SODIUM CHLORIDE 9 MG/ML
1000 INJECTION, SOLUTION INTRAVENOUS
Refills: 0 | Status: DISCONTINUED | OUTPATIENT
Start: 2023-02-04 | End: 2023-02-06

## 2023-02-04 RX ADMIN — Medication 1 SPRAY(S): at 23:30

## 2023-02-04 RX ADMIN — ENOXAPARIN SODIUM 40 MILLIGRAM(S): 100 INJECTION SUBCUTANEOUS at 09:20

## 2023-02-04 RX ADMIN — CHLORHEXIDINE GLUCONATE 15 MILLILITER(S): 213 SOLUTION TOPICAL at 18:29

## 2023-02-04 RX ADMIN — Medication 1 TABLET(S): at 09:20

## 2023-02-04 RX ADMIN — MONTELUKAST 10 MILLIGRAM(S): 4 TABLET, CHEWABLE ORAL at 09:21

## 2023-02-04 RX ADMIN — Medication 81 MILLIGRAM(S): at 13:00

## 2023-02-04 RX ADMIN — FINASTERIDE 5 MILLIGRAM(S): 5 TABLET, FILM COATED ORAL at 09:20

## 2023-02-04 RX ADMIN — POTASSIUM PHOSPHATE, MONOBASIC POTASSIUM PHOSPHATE, DIBASIC 83.33 MILLIMOLE(S): 236; 224 INJECTION, SOLUTION INTRAVENOUS at 10:47

## 2023-02-04 RX ADMIN — POTASSIUM PHOSPHATE, MONOBASIC POTASSIUM PHOSPHATE, DIBASIC 83.33 MILLIMOLE(S): 236; 224 INJECTION, SOLUTION INTRAVENOUS at 23:01

## 2023-02-04 RX ADMIN — Medication 1 DROP(S): at 09:21

## 2023-02-04 RX ADMIN — Medication 1 DROP(S): at 23:02

## 2023-02-04 RX ADMIN — SODIUM CHLORIDE 80 MILLILITER(S): 9 INJECTION, SOLUTION INTRAVENOUS at 10:30

## 2023-02-04 RX ADMIN — CHLORHEXIDINE GLUCONATE 15 MILLILITER(S): 213 SOLUTION TOPICAL at 06:40

## 2023-02-04 RX ADMIN — FLUPHENAZINE HYDROCHLORIDE 2.5 MILLIGRAM(S): 1 TABLET, FILM COATED ORAL at 09:20

## 2023-02-04 RX ADMIN — TAMSULOSIN HYDROCHLORIDE 0.4 MILLIGRAM(S): 0.4 CAPSULE ORAL at 23:35

## 2023-02-04 RX ADMIN — Medication 50 MILLIGRAM(S): at 23:35

## 2023-02-04 RX ADMIN — Medication 1 SPRAY(S): at 09:21

## 2023-02-04 NOTE — PROGRESS NOTE ADULT - PROBLEM SELECTOR PLAN 8
F: s/p NS 2L, LR @80/hr x6 hours  E: replenish K<4, Mg<2  N: Regular    VTE Prophylaxis: Lovenox 40 Q24H  GI: not needed  C: Full Code  D: SILVER F: s/p NS 2L, LR @80/hr x6 hours  E: replenish K<4, Mg<2  N: Regular  VTE Prophylaxis: Lovenox 40 Q24H  GI: not needed  C: Full Code  D: SILVER

## 2023-02-04 NOTE — DISCHARGE NOTE PROVIDER - CARE PROVIDER_API CALL
Varghese Kolb)  Gastroenterology; Internal Medicine  [address] [place] [address]  Phone: [phone]  Fax: [phone]  Follow Up Time: 1 week   Varghese Kolb)  Gastroenterology; Internal Medicine  [address] [place] [address]  Phone: [phone]  Fax: [phone]  Follow Up Time: 1 week    Han Posey; DENNY)  Internal Medicine  [address] [place] [address]  Phone: [phone]  Fax: [phone]  Follow Up Time:

## 2023-02-04 NOTE — PHYSICAL THERAPY INITIAL EVALUATION ADULT - GROSSLY INTACT, SENSORY
Patient Seen in: [facility] In [address] [place] [address]    History   Patient presents with:  Lower Extremity Injury (musculoskeletal)    Stated Complaint: foot pain    HPI    Patient presents to the emergency department complaining of right foot pa (5' 7\")  Wt 60.782 kg  BMI 20.98 kg/m2  SpO2 100%  LMP 03/21/2017 (Exact Date)        Physical Exam   Constitutional: She appears well-developed and well-nourished. She is active.    Cardiovascular: Normal rate, regular rhythm, S1 normal and S2 normal.  Pu Left UE/Right UE/Left LE/Right LE/Head/Neck/Trunk/Grossly Intact

## 2023-02-04 NOTE — DISCHARGE NOTE PROVIDER - ATTENDING DISCHARGE PHYSICAL EXAMINATION:
Patient was seen and examined at bedside on 2/9/2023 at 1130 am. Patient reports overall feeling well. Denies abdominal pain, diarrhea, nausea or vomiting, CP, urinary symptoms. ROS is otherwise negative. Vitals, labwork and pertinent imaging reviewed. Physical exam - NAD, Alert, PERRLA, EOMI, supple neck, chest - CTA b/l, CV - s1s2, no m/r/g, abd - soft, non tender, + BS,  ext - wwp, psych - normal affect, skin - no rash, back - midline    Plan  -Medically ready for d/c today  -Will need close outpatient f/u for screening C - scope for eval of possible cecal mass

## 2023-02-04 NOTE — DISCHARGE NOTE PROVIDER - NSDCCPTREATMENT_GEN_ALL_CORE_FT
PRINCIPAL PROCEDURE  Procedure: CT scan  Findings and Treatment: IMPRESSION:  1. Small hiatal hernia.  2. Suspicious for cecal carcinoma. No definitive distant metastatic malignancy  or intestinal obstruction.  3. Ileus with findings consistent with history of diarrhea.  4. No intrathoracic metastatic disease.

## 2023-02-04 NOTE — PHYSICAL THERAPY INITIAL EVALUATION ADULT - GENERAL OBSERVATIONS, REHAB EVAL
PT IE completed. Chart reviewed. Patient without complaints of pain at rest, agreeable to PT. Patient received semi-supine, NAD, +CONTACT precautions, +IV hep lock, FERN Schofield cleared patient for treatment session and remained present throughout.

## 2023-02-04 NOTE — PHYSICAL THERAPY INITIAL EVALUATION ADULT - ADDITIONAL COMMENTS
Social history and PLOF obtained from H&P on [place] - "Ambulates without assistive devices at baseline but hasn't been walking since his episodes of diarrhea last night. Lives at [facility] for patients with intellectual and developmental disabilities." Social history and PLOF obtained from H&P on [place] - "Ambulates without assistive devices at baseline but hasn't been walking since his episodes of diarrhea last night. Lives at [facility] for patients with intellectual and developmental disabilities." -- Patient endorsed to therapist that "he has a few stairs to get into his home".

## 2023-02-04 NOTE — DISCHARGE NOTE PROVIDER - PROVIDER TOKENS
PROVIDER:[TOKEN:[[id number]],FOLLOWUP:[1 week]] PROVIDER:[TOKEN:[[id number]],FOLLOWUP:[1 week]],PROVIDER:[TOKEN:[[id number]]]

## 2023-02-04 NOTE — DISCHARGE NOTE PROVIDER - NSDCFUADDAPPT_GEN_ALL_CORE_FT
You will be contacted by our GI team to schedule an appointment for your colonoscopy for further work-up and management of the mass in your colon.    Please also make sure to follow-up with your PCP upon leaving the hospital.

## 2023-02-04 NOTE — PROGRESS NOTE ADULT - SUBJECTIVE AND OBJECTIVE BOX
Pt seen and examined at bedside.  NAEO. Pt in NAD. Unable to communicate/obtain ROS due to baseline cognitive delay      Allergies    No Known Allergies    Intolerances        MEDICATIONS:  MEDICATIONS  (STANDING):  artificial  tears Solution 1 Drop(s) Both EYES every 12 hours  aspirin  chewable 81 milliGRAM(s) Oral daily  chlorhexidine 0.12% Liquid 15 milliLiter(s) Oral Mucosa two times a day  enoxaparin Injectable 40 milliGRAM(s) SubCutaneous every 24 hours  finasteride 5 milliGRAM(s) Oral every 24 hours  fluPHENAZine 2.5 milliGRAM(s) Oral every 12 hours  fluticasone propionate 50 MICROgram(s)/spray Nasal Spray 1 Spray(s) Both Nostrils every 12 hours  lactated ringers. 1000 milliLiter(s) (80 mL/Hr) IV Continuous <Continuous>  montelukast 10 milliGRAM(s) Oral every 24 hours  multivitamin/minerals 1 Tablet(s) Oral every 24 hours  tamsulosin 0.4 milliGRAM(s) Oral at bedtime  traZODone 50 milliGRAM(s) Oral at bedtime    MEDICATIONS  (PRN):  acetaminophen     Tablet .. 650 milliGRAM(s) Oral every 6 hours PRN Temp greater or equal to 38C (100.4F), Mild Pain (1 - 3)  albuterol    90 MICROgram(s) HFA Inhaler 2 Puff(s) Inhalation every 6 hours PRN Shortness of Breath and/or Wheezing      Vital Signs Last 24 Hrs  T(C): 36.3 (04 Feb 2023 11:50), Max: 36.9 (04 Feb 2023 06:16)  T(F): 97.4 (04 Feb 2023 11:50), Max: 98.5 (04 Feb 2023 06:16)  HR: 73 (04 Feb 2023 11:50) (63 - 77)  BP: 138/68 (04 Feb 2023 11:50) (138/68 - 167/83)  BP(mean): --  RR: 18 (04 Feb 2023 11:50) (18 - 19)  SpO2: 95% (04 Feb 2023 11:50) (95% - 98%)    Parameters below as of 04 Feb 2023 11:50  Patient On (Oxygen Delivery Method): room air          PHYSICAL EXAM:    General: No acute distress  HEENT: MMM, conjunctiva and sclera clear  Gastrointestinal: Soft non-distended. No rebound or guarding  Skin: Warm and dry. No obvious rash    LABS:  CBC Full  -  ( 04 Feb 2023 05:30 )  WBC Count : 3.75 K/uL  RBC Count : 3.60 M/uL  Hemoglobin : 11.8 g/dL  Hematocrit : 35.2 %  Platelet Count - Automated : 132 K/uL  Mean Cell Volume : 97.8 fl  Mean Cell Hemoglobin : 32.8 pg  Mean Cell Hemoglobin Concentration : 33.5 gm/dL  Auto Neutrophil # : 2.28 K/uL  Auto Lymphocyte # : 0.99 K/uL  Auto Monocyte # : 0.41 K/uL  Auto Eosinophil # : 0.05 K/uL  Auto Basophil # : 0.01 K/uL  Auto Neutrophil % : 60.8 %  Auto Lymphocyte % : 26.4 %  Auto Monocyte % : 10.9 %  Auto Eosinophil % : 1.3 %  Auto Basophil % : 0.3 %    02-04    140  |  111<H>  |  23  ----------------------------<  97  4.2   |  23  |  0.73    Ca    7.9<L>      04 Feb 2023 05:30  Phos  1.0     02-04  Mg     2.5     02-04    TPro  5.9<L>  /  Alb  3.0<L>  /  TBili  0.2  /  DBili  x   /  AST  29  /  ALT  20  /  AlkPhos  60  02-04    PT/INR - ( 03 Feb 2023 04:24 )   PT: 14.2 sec;   INR: 1.19          PTT - ( 03 Feb 2023 04:24 )  PTT:29.3 sec                  RADIOLOGY & ADDITIONAL STUDIES:

## 2023-02-04 NOTE — DISCHARGE NOTE PROVIDER - HOSPITAL COURSE
#Discharge: do not delete    Patient is 78yo M with past medical history of asthma, developmental delay (nonverbal at baseline), BPH, glaucoma, presented with diarrhea, found to have norovirus. 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     Hospital course (by problem):     #Gastritis.   Patient with 1 day of nonbloody vomiting and diarrhea, history of similar symptoms with group home residents. Patient not meeting sepsis criteria. CT A/P showed ileus with findings c/w history of diarrhea. CT with significant stool burden. GI PCR positive for norovirus. Blood cultures NGTD. Supportive care with hydration and encouraging oral intake.   - f/u with PCP    #Abnormal finding on CT scan.   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 Escort notes slight weight loss in the patient in the past few months. Unknown colonoscopy history. GI consulted, recommend colonoscopy and resection of mass.  -  Patient's legal guardian is Mark Anthony Zavala [phone]; unable to reach at this time but will reattempt to obtain consent and discuss patient's GOC.    #Asthma.   Patient with O2 requirements on admission, initially requiring 15L NRB and weaned to 4L NC. Patient weaned to room air and satting 95%. No wheezing noted on exam.  - continue home medications    #Developmental delay.   Patient with chronic developmental delay, nonverbal at baseline, lives in group home for patients with developmental delay. Home medications trifluoperazine 5 mg po qd, trazodone 50 mg po qhs. Follows simple commands.  - continue home medications    #Normal anion gap metabolic acidosis.   RESOLVED. Admission HCO3 20 with normal anion gap consistent with history of diarrhea. s/p 2L NS in ED. Dry mucous membranes on exam. Given gentle maintenance fluids. Encouraged oral intake.    #Electrolyte imbalances  Hypokalemia, hypomagnesemia, hypophosphatemia. ECG with no T wave changes, no U waves. Electrolytes repleted aggressively.   - supplement diet as needed  - f/u with PCP    #BPH (benign prostatic hyperplasia).   Home medications finasteride 5 mg po qd, tamsulosin 0.4 mg po qhs  - continue home meds.    Patient was discharged to: [facility] with home PT    New medications: none  Changes to old medications: none  Medications that were stopped: none    Items to follow up as outpatient: PCP appt, check electrolytes    Physical exam at the time of discharge:    Constitutional: resting comfortably in bed; NAD  Head: NC/AT  Eyes: PERRL, EOMI, anicteric sclera  ENT: no nasal discharge; MMM  Neck: supple; no JVD or thyromegaly  Respiratory: CTA B/L; no W/R/R, no retractions  Cardiac: +S1/S2; RRR; no M/R/G  Gastrointestinal: soft, NT/ND; no rebound or guarding; +BSx4  Extremities: WWP, no clubbing or cyanosis; no peripheral edema.   Musculoskeletal: NROM x4; no joint swelling, tenderness or erythema  Vascular: 2+ radial, DP/PT pulses B/L  Neurologic: AAOx1; CNII-XII grossly intact; no focal deficits   #Discharge: do not delete    Patient is 78yo M with past medical history of asthma, developmental delay (nonverbal at baseline), BPH, glaucoma, presented with diarrhea, found to have norovirus. 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     Hospital course (by problem):     #Gastritis.   Patient with 1 day of nonbloody vomiting and diarrhea, history of similar symptoms with group home residents. Patient not meeting sepsis criteria. CT A/P showed ileus with findings c/w history of diarrhea. CT with significant stool burden.    - GI PCR positive for norovirus, received symptomatic treatment  - blood cultures NGTD  - no further episodes of diarrhea.    #Abnormal finding on CT scan.   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 Escort notes slight weight loss in the patient in the past few months. Unknown colonoscopy history.  - GI consulted, recommending C-scope which can be completed outpatient.  - patient's legal guardian is Mark Anthony Zavala [phone]; [facility] conversation documented in chart    #Asthma.   Patient with O2 requirements on admission, initially requiring 15L NRB and weaned to 4L NC. Patient weaned to room air and satting 95%. No wheezing noted on exam.  - continue home medications    #Developmental delay.   Patient with chronic developmental delay, nonverbal at baseline, lives in group home for patients with developmental delay. Home medications trifluoperazine 5 mg po qd, trazodone 50 mg po qhs. Follows simple commands.  - continue home medications    #Normal anion gap metabolic acidosis.   RESOLVED. Admission HCO3 20 with normal anion gap consistent with history of diarrhea. s/p 2L NS in ED. Dry mucous membranes on exam. Given gentle maintenance fluids. Encouraged oral intake.    #Electrolyte imbalances  Hypokalemia, hypomagnesemia, hypophosphatemia. ECG with no T wave changes, no U waves. Electrolytes repleted aggressively.   - supplement diet as needed  - f/u with PCP    #BPH (benign prostatic hyperplasia).   Home medications finasteride 5 mg po qd, tamsulosin 0.4 mg po qhs  - continue home meds.    Patient was discharged to: [facility] with home PT  New medications: none  Changes to old medications: none  Medications that were stopped: none    Items to follow up as outpatient: PCP, GI    Physical exam at the time of discharge:    Constitutional: resting comfortably in bed; NAD  Head: NC/AT  Eyes: PERRL, EOMI, anicteric sclera  ENT: no nasal discharge; MMM  Neck: supple; no JVD or thyromegaly  Respiratory: CTA B/L; no W/R/R, no retractions  Cardiac: +S1/S2; RRR; no M/R/G  Gastrointestinal: soft, NT/ND; no rebound or guarding; +BSx4  Extremities: WWP, no clubbing or cyanosis; no peripheral edema.   Musculoskeletal: NROM x4; no joint swelling, tenderness or erythema  Vascular: 2+ radial, DP/PT pulses B/L  Neurologic: AAOx1; CNII-XII grossly intact; no focal deficits   #Discharge: do not delete    Patient is 76yo M with past medical history of asthma, developmental delay (nonverbal at baseline), BPH, glaucoma, presented with diarrhea, found to have norovirus. 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     Hospital course (by problem):     #Gastritis.   Patient with 1 day of nonbloody vomiting and diarrhea, history of similar symptoms with group home residents. Patient not meeting sepsis criteria. CT A/P showed ileus with findings c/w history of diarrhea. CT with significant stool burden.    - GI PCR positive for norovirus, received symptomatic treatment  - blood cultures NGTD  - no further episodes of diarrhea.    #Abnormal finding on CT scan.   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 Escort notes slight weight loss in the patient in the past few months. Unknown colonoscopy history.  - GI consulted, recommending C-scope which can be completed outpatient.  - patient's legal guardian is Mark Anthony Zavala [phone]; [facility] conversation documented in chart    #Hypertension  Pt w/ elevated blood pressures, 180s/100s, improved on Lisinopril 10 mg QD.     -outpatient follow-up w/ PCP    #Asthma.   Patient with O2 requirements on admission, initially requiring 15L NRB and weaned to 4L NC. Patient weaned to room air and satting 95%. No wheezing noted on exam.  - continue home medications    #Developmental delay.   Patient with chronic developmental delay, nonverbal at baseline, lives in group home for patients with developmental delay. Home medications trifluoperazine 5 mg po qd, trazodone 50 mg po qhs. Follows simple commands.  - continue home medications    #Normal anion gap metabolic acidosis.   RESOLVED. Admission HCO3 20 with normal anion gap consistent with history of diarrhea. s/p 2L NS in ED. Dry mucous membranes on exam. Given gentle maintenance fluids. Encouraged oral intake.    #Electrolyte imbalances  Hypokalemia, hypomagnesemia, hypophosphatemia. ECG with no T wave changes, no U waves. Electrolytes repleted aggressively.   - supplement diet as needed  - f/u with PCP    #BPH (benign prostatic hyperplasia).   Home medications finasteride 5 mg po qd, tamsulosin 0.4 mg po qhs  - continue home meds.    Patient was discharged to: [facility] with home PT  New medications: Lisinopril 10 mg QD  Changes to old medications: none  Medications that were stopped: none    Items to follow up as outpatient: PCP, GI    Physical exam at the time of discharge:    Constitutional: resting comfortably in bed; NAD  Head: NC/AT  Eyes: PERRL, EOMI, anicteric sclera  ENT: no nasal discharge; MMM  Neck: supple; no JVD or thyromegaly  Respiratory: CTA B/L; no W/R/R, no retractions  Cardiac: +S1/S2; RRR; no M/R/G  Gastrointestinal: soft, NT/ND; no rebound or guarding; +BSx4  Extremities: WWP, no clubbing or cyanosis; no peripheral edema.   Musculoskeletal: NROM x4; no joint swelling, tenderness or erythema  Vascular: 2+ radial, DP/PT pulses B/L  Neurologic: AAOx1; CNII-XII grossly intact; no focal deficits

## 2023-02-04 NOTE — PROGRESS NOTE ADULT - PROBLEM SELECTOR PLAN 1
Patient with 1 day of nonbloody vomiting and diarrhea, history of similar symptoms with group home residents. Patient not meeting sepsis criteria.  CT A/P showed ileus with findings c/w history of diarrhea. CT with significant stool burden.  - f/u GI PCR  - f/u blood cultures  - bowel regimen Patient with 1 day of nonbloody vomiting and diarrhea, history of similar symptoms with group home residents. Patient not meeting sepsis criteria.  CT A/P showed ileus with findings c/w history of diarrhea. CT with significant stool burden.  - GI PCR positive for norovirus  - blood cultures NGTD

## 2023-02-04 NOTE — DISCHARGE NOTE PROVIDER - CARE PROVIDERS DIRECT ADDRESSES
-nail care as outlined/directed in AVS  -symptomatic care as discussed/outlined in AVS    Follow-up with primary care provider and/or podiatry in the next 2-4 days for re-evaluation. If NEW symptoms develop, please contact primary care provider, podiatry, or follow-up with the [facility]. Seek immediate medical attention at the nearest Emergency Room as discussed and/or indicated/outlined below in AVS.    Please review additional instructions as documented in the AVS below.    Thank you for visiting [facility].          Patient Education     Ingrown Toenail (Excised)  An ingrown toenail occurs when the nail grows sideways into the skin next to the nail. This can cause pain and may lead to an infection with redness, swelling, and sometimes drainage.  The most common cause of an ingrown toenail is trimming your toenails wrong. Most people trim the nails too close to the skin and try to round the nail too tightly around the shape of the toe. When you do this, the nail can grow into the skin of the toe. While it may look nice, your toenail can grow into the skin and cause infection. It is safer to trim the nail to end in a straight line rather than a curve.  Other causes include injury or wearing shoes that are too short or tight. This can cause the same problem that happens when trimming your toenails. Sometimes you are born with a toenail that grows too large for your toe.  The most common symptoms of an ingrown toenail include:  · Pain  · Redness  · Swelling  · Drainage  Treatment  It's important to treat an ingrown toenail as soon as you notice there is a problem. If the infection is mild, you may be able to take care of it at home. Home care includes:  · Frequent warm water soaks  · Keeping the nail clean  · Wearing loose, comfortable shoes or open toe sandals  Another method to help the toe heal is to use a small piece of cotton or waxed dental floss to gently lift the corner of the problem nail. Change the  cotton or floss frequently, especially if it gets dirty.  If your infection is mild but home care isn't working, or the toenail is getting worse, see your healthcare provider. Signs of worsening infection include:  · Swelling  · Redness   · Pus drainage  In some cases, part of the toenail needs to be removed by your healthcare provider so that the infection can be drained.  If there is a lot of redness and swelling, then an antibiotic may also be used. The redness and pain should go away within 48 hours. It will take about 2 weeks for the exposed nail bed to become dry and for the swelling to go down.  If only the side of the nail was removed, it will begin to grow back in a few months. To prevent recurrence, sometimes the side of the nail bed may be treated with a strong chemical to prevent the nail from growing back.  Home care  Wound care  · Twice a day for the first 3 days, clean and soak the toe as follows:  ? Soak your foot in a tub of warm water for 5 minutes. Or, hold your toe under a faucet of warm running water for 5 minutes.  ? Clean any remaining crust away with soap and water using a cotton swab.  ? Put a small amount of antibiotic ointment on the infected area.  ? Cover with a bandage until the exposed nail bed is dry and there is no more drainage.  · Change the dressing or bandage every time you soak or clean it, or whenever it becomes wet or dirty.  · If you were prescribed antibiotics, take them as directed until they are all gone.  · While your toe is healing wear comfortable shoes with a lot of toe room. Or wear open-toe sandals.  Medicines  · You can take over-the-counter medicine for pain, unless you were given a different pain medicine to use. Note: Talk with your healthcare provider before using these medicines if you have chronic liver or kidney disease, have ever had a stomach ulcer or GI (gastrointestinal) bleeding, or are taking blood thinner medicines.  · If you were given antibiotics,  take them until they are all gone. It is important to finish the antibiotics even if the wound looks better. This ensures that the infection clears.  Prevention  To prevent ingrown toenails:  · Wear shoes that fit well. Avoid shoes that pinch the toes together.  · When you trim your toenails, don’t cut them too short. Cut straight across at the top and don’t round the edges.  · Don’t use a sharp object to clean under your nail since this might cause an infection.  · If the toenail starts to grow into the skin again, put a small piece of cotton under that side of the nail to help it grow out straight.  Follow-up care  Follow up as advised by your healthcare provider. If the ingrown toenail recurs, follow up with a foot specialist (podiatrist) for nail bed ablation.  When to seek medical care  Call your healthcare provider right away if any of these occur:  · Increasing redness, pain, or swelling of the toe  · Red streaks in the skin leading away from the wound  · Continued pus or fluid drainage for more than 24 hours  · Fever of 100.4°F (38°C) or higher, or as directed by your provider  Date Last Reviewed: 11/1/2016 © 2000-2018 [employer]. [address] [place] [address], [place] [address]. All rights reserved. This information is not intended as a substitute for professional medical care. Always follow your healthcare professional's instructions.           Patient Education     Ingrown Toenail, Not Infected (Home Treatment)  An ingrown toenail occurs when the nail grows sideways into the skin next to the nail. This can cause pain, especially when wearing tight shoes. It can also lead to an infection with redness, swelling, and pus drainage. Most people respond to the treatments described here. But sometimes surgery is needed. The big toe is most often affected.   The most common cause of an ingrown toenail is trimming your nails wrong. Most people trim the nails too close to the skin and try to round the nail  [email or web address] too tightly around the shape of the toe. When you do this, the nail can grow into the skin of your toe. It is safer to trim the nail ending in a straight line rather than a curve.  Home care  The following guidelines will help you care for your toenail at home:  · Soak the painful toe in warm water 3 to 4 times each day, for 10 to 20 minutes each time. Adding Epsom salt may be recommended by your healthcare provider. Wash the entire foot with an antibacterial soap. Then keep it dry.  · If there is redness or swelling around the toenail, apply an antibiotic ointment 3 times a day.  · Insert a small piece of rolled-up cotton under the corner of the nail. This helps the nail to grow outward, away from the cuticle.  · Wear shoes that don’t put pressure on the toes, such as a sandal or open shoe. Closed shoes should be big enough in the toes so that there is no pressure on the painful toe.  · You may use acetaminophen or ibuprofen for pain, unless another pain medicine was prescribed. Talk with your healthcare provider before using these medicines if you have chronic liver or kidney disease. Also tell your provider if you have ever had a stomach ulcer or GI (gastrointestinal) bleeding.  Prevention  The following tips will help you prevent ingrown toenails:  · Avoid pointed, tight, or narrow shoes.  · Trim toenails once a month so they don’t grow too long. Cut the nail straight across.  Follow-up care  Follow up with your healthcare provider, or as advised.  When to seek medical advice  Call your healthcare provider right away if any of these occur:  · Increasing redness, pain, or swelling of the toe  · Tender red streaks in the skin leading toward the ankle  · Pus or fluid drainage from the toe  · Fever of 100.4°F (38°C) or higher, or as directed by your provider  Date Last Reviewed: 4/1/2017 © 2000-2018 [employer]. [address] [place] [address], [place] [address]. All rights reserved. This information is not  intended as a substitute for professional medical care. Always follow your healthcare professional's instructions.            [email or web address]

## 2023-02-04 NOTE — PHYSICAL THERAPY INITIAL EVALUATION ADULT - PHYSICAL ASSIST/NONPHYSICAL ASSIST: SUPINE/SIT, REHAB EVAL
able to bring B/L LEs to EOB with VCs; *increased assist for trunk mobility/verbal cues/nonverbal cues (demo/gestures) able to bring B/L LEs to EOB with VCs; *increased time required to complete trunk mobility/verbal cues/nonverbal cues (demo/gestures)

## 2023-02-04 NOTE — PROGRESS NOTE ADULT - SUBJECTIVE AND OBJECTIVE BOX
KASANDRA ISLAS  77y, Male    Patient is a 77y old  Male who presents with a chief complaint of Gastroenteritis (03 Feb 2023 10:27)      INTERVAL HPI/OVERNIGHT EVENTS:    ROS: otherwise negative      T(C): 36.9 (02-04-23 @ 06:16), Max: 36.9 (02-03-23 @ 11:20)  HR: 76 (02-04-23 @ 06:16) (63 - 77)  BP: 167/83 (02-04-23 @ 06:16) (120/62 - 167/83)  RR: 19 (02-04-23 @ 06:16) (16 - 19)  SpO2: 98% (02-04-23 @ 06:16) (95% - 98%)  Wt(kg): --Vital Signs Last 24 Hrs  T(C): 36.9 (04 Feb 2023 06:16), Max: 36.9 (03 Feb 2023 11:20)  T(F): 98.5 (04 Feb 2023 06:16), Max: 98.5 (04 Feb 2023 06:16)  HR: 76 (04 Feb 2023 06:16) (63 - 77)  BP: 167/83 (04 Feb 2023 06:16) (120/62 - 167/83)  BP(mean): --  RR: 19 (04 Feb 2023 06:16) (16 - 19)  SpO2: 98% (04 Feb 2023 06:16) (95% - 98%)    Parameters below as of 04 Feb 2023 06:16  Patient On (Oxygen Delivery Method): room air        PHYSICAL EXAM:  Constitutional: resting comfortably in bed; NAD  Head: NC/AT  Eyes: PERRL, EOMI, anicteric sclera  ENT: no nasal discharge; MMM  Neck: supple; no JVD or thyromegaly  Respiratory: CTA B/L; no W/R/R, no retractions  Cardiac: +S1/S2; RRR; no M/R/G; PMI non-displaced  Gastrointestinal: soft, NT/ND; no rebound or guarding; +BSx4  Back: spine midline, no bony tenderness or step-offs; no CVAT B/L  Extremities: WWP, no clubbing or cyanosis; no peripheral edema. Capillary refill <2 sec  Musculoskeletal: NROM x4; no joint swelling, tenderness or erythema  Vascular: 2+ radial, DP/PT pulses B/L  Dermatologic: skin warm, dry and intact; no rashes, wounds, or scars  Lymphatic: no submandibular or cervical LAD  Neurologic: AAOx3; CNII-XII grossly intact; no focal deficits  Psychiatric: affect and characteristics of appearance, verbalizations, behaviors are appropriate    LABS:                        14.1   4.67  )-----------( 163      ( 03 Feb 2023 04:24 )             40.7     02-03    133<L>  |  103  |  27<H>  ----------------------------<  104<H>  3.2<L>   |  24  |  0.71    Ca    7.4<L>      03 Feb 2023 14:50  Mg     1.7     02-03    TPro  7.3  /  Alb  3.6  /  TBili  0.6  /  DBili  x   /  AST  19  /  ALT  16  /  AlkPhos  72  02-03      PT/INR - ( 03 Feb 2023 04:24 )   PT: 14.2 sec;   INR: 1.19          PTT - ( 03 Feb 2023 04:24 )  PTT:29.3 sec    CAPILLARY BLOOD GLUCOSE                MEDICATIONS  (STANDING):  artificial  tears Solution 1 Drop(s) Both EYES every 12 hours  aspirin  chewable 81 milliGRAM(s) Oral daily  chlorhexidine 0.12% Liquid 15 milliLiter(s) Oral Mucosa two times a day  enoxaparin Injectable 40 milliGRAM(s) SubCutaneous every 24 hours  finasteride 5 milliGRAM(s) Oral every 24 hours  fluPHENAZine 2.5 milliGRAM(s) Oral every 12 hours  fluticasone propionate 50 MICROgram(s)/spray Nasal Spray 1 Spray(s) Both Nostrils every 12 hours  lactated ringers. 1000 milliLiter(s) (80 mL/Hr) IV Continuous <Continuous>  montelukast 10 milliGRAM(s) Oral every 24 hours  multivitamin/minerals 1 Tablet(s) Oral every 24 hours  tamsulosin 0.4 milliGRAM(s) Oral at bedtime  traZODone 50 milliGRAM(s) Oral at bedtime    MEDICATIONS  (PRN):  acetaminophen     Tablet .. 650 milliGRAM(s) Oral every 6 hours PRN Temp greater or equal to 38C (100.4F), Mild Pain (1 - 3)  albuterol    90 MICROgram(s) HFA Inhaler 2 Puff(s) Inhalation every 6 hours PRN Shortness of Breath and/or Wheezing      RADIOLOGY & ADDITIONAL TESTS: Reviewed   KASANDRA ISLAS  77y, Male    Patient is a 77y old  Male who presents with a chief complaint of Gastroenteritis (03 Feb 2023 10:27)      INTERVAL HPI/OVERNIGHT EVENTS: YA overnight. Patient seen and examined at bedside. Oriented to self. Able to follow some commands. Denies pain. No other complaints.     ROS: otherwise negative      T(C): 36.9 (02-04-23 @ 06:16), Max: 36.9 (02-03-23 @ 11:20)  HR: 76 (02-04-23 @ 06:16) (63 - 77)  BP: 167/83 (02-04-23 @ 06:16) (120/62 - 167/83)  RR: 19 (02-04-23 @ 06:16) (16 - 19)  SpO2: 98% (02-04-23 @ 06:16) (95% - 98%)  Wt(kg): --Vital Signs Last 24 Hrs  T(C): 36.9 (04 Feb 2023 06:16), Max: 36.9 (03 Feb 2023 11:20)  T(F): 98.5 (04 Feb 2023 06:16), Max: 98.5 (04 Feb 2023 06:16)  HR: 76 (04 Feb 2023 06:16) (63 - 77)  BP: 167/83 (04 Feb 2023 06:16) (120/62 - 167/83)  BP(mean): --  RR: 19 (04 Feb 2023 06:16) (16 - 19)  SpO2: 98% (04 Feb 2023 06:16) (95% - 98%)    Parameters below as of 04 Feb 2023 06:16  Patient On (Oxygen Delivery Method): room air        PHYSICAL EXAM:  Constitutional: resting comfortably in bed; NAD  Head: NC/AT  Eyes: PERRL, EOMI, anicteric sclera  ENT: no nasal discharge; MMM  Neck: supple; no JVD or thyromegaly  Respiratory: CTA B/L; no W/R/R, no retractions  Cardiac: +S1/S2; RRR; no M/R/G  Gastrointestinal: soft, NT/ND; no rebound or guarding; +BSx4  Extremities: WWP, no clubbing or cyanosis; no peripheral edema.   Musculoskeletal: NROM x4; no joint swelling, tenderness or erythema  Vascular: 2+ radial, DP/PT pulses B/L  Neurologic: AAOx1; CNII-XII grossly intact; no focal deficits      LABS:                        14.1   4.67  )-----------( 163      ( 03 Feb 2023 04:24 )             40.7     02-03    133<L>  |  103  |  27<H>  ----------------------------<  104<H>  3.2<L>   |  24  |  0.71    Ca    7.4<L>      03 Feb 2023 14:50  Mg     1.7     02-03    TPro  7.3  /  Alb  3.6  /  TBili  0.6  /  DBili  x   /  AST  19  /  ALT  16  /  AlkPhos  72  02-03      PT/INR - ( 03 Feb 2023 04:24 )   PT: 14.2 sec;   INR: 1.19          PTT - ( 03 Feb 2023 04:24 )  PTT:29.3 sec    CAPILLARY BLOOD GLUCOSE                MEDICATIONS  (STANDING):  artificial  tears Solution 1 Drop(s) Both EYES every 12 hours  aspirin  chewable 81 milliGRAM(s) Oral daily  chlorhexidine 0.12% Liquid 15 milliLiter(s) Oral Mucosa two times a day  enoxaparin Injectable 40 milliGRAM(s) SubCutaneous every 24 hours  finasteride 5 milliGRAM(s) Oral every 24 hours  fluPHENAZine 2.5 milliGRAM(s) Oral every 12 hours  fluticasone propionate 50 MICROgram(s)/spray Nasal Spray 1 Spray(s) Both Nostrils every 12 hours  lactated ringers. 1000 milliLiter(s) (80 mL/Hr) IV Continuous <Continuous>  montelukast 10 milliGRAM(s) Oral every 24 hours  multivitamin/minerals 1 Tablet(s) Oral every 24 hours  tamsulosin 0.4 milliGRAM(s) Oral at bedtime  traZODone 50 milliGRAM(s) Oral at bedtime    MEDICATIONS  (PRN):  acetaminophen     Tablet .. 650 milliGRAM(s) Oral every 6 hours PRN Temp greater or equal to 38C (100.4F), Mild Pain (1 - 3)  albuterol    90 MICROgram(s) HFA Inhaler 2 Puff(s) Inhalation every 6 hours PRN Shortness of Breath and/or Wheezing      RADIOLOGY & ADDITIONAL TESTS: Reviewed

## 2023-02-04 NOTE — DISCHARGE NOTE PROVIDER - NSDCMRMEDTOKEN_GEN_ALL_CORE_FT
Albuterol (Eqv-Proventil HFA) 90 mcg/inh inhalation aerosol: 2 puff(s) inhaled every 6 hours, As Needed  aspirin 81 mg oral tablet: 1 tab(s) orally once a day  chlorhexidine 0.12% mucous membrane liquid: 15 milliliter(s) mucous membrane 2 times a day  finasteride 5 mg oral tablet: 1 tab(s) orally once a day  fluticasone 50 mcg/inh nasal spray: 1 spray(s) nasal once a day  montelukast 10 mg oral tablet: 1 tab(s) orally once a day  Systane Ultra ophthalmic solution: 1 drop(s) to each affected eye 3 times a day  tamsulosin 0.4 mg oral capsule: 1 cap(s) orally once a day  Thera-M oral tablet: 1 tab(s) orally once a day  traZODone 50 mg oral tablet: 1 tab(s) orally once a day  triamcinolone 0.1% topical ointment: Apply topically to affected area 2 times a week, As Needed  trifluoperazine 5 mg oral tablet: 1 tab(s) orally once a day   Albuterol (Eqv-Proventil HFA) 90 mcg/inh inhalation aerosol: 2 puff(s) inhaled every 6 hours, As Needed  aspirin 81 mg oral tablet: 1 tab(s) orally once a day  benzonatate 100 mg oral capsule: 1 cap(s) orally 3 times a day  chlorhexidine 0.12% mucous membrane liquid: 15 milliliter(s) mucous membrane 2 times a day  finasteride 5 mg oral tablet: 1 tab(s) orally once a day  fluticasone 50 mcg/inh nasal spray: 1 spray(s) nasal once a day  montelukast 10 mg oral tablet: 1 tab(s) orally once a day  [place]il[place] Blue Itchy Dry Scalp 1% topical shampoo: Apply topically to affected area 3 times a week  Systane Ultra ophthalmic solution: 1 drop(s) to each affected eye 3 times a day  tamsulosin 0.4 mg oral capsule: 1 cap(s) orally once a day  Thera-M oral tablet: 1 tab(s) orally once a day  traZODone 50 mg oral tablet: 1 tab(s) orally once a day  trifluoperazine 5 mg oral tablet: 1 tab(s) orally once a day   Albuterol (Eqv-Proventil HFA) 90 mcg/inh inhalation aerosol: 2 puff(s) inhaled every 6 hours, As Needed  aspirin 81 mg oral tablet: 1 tab(s) orally once a day  benzonatate 100 mg oral capsule: 1 cap(s) orally 3 times a day  chlorhexidine 0.12% mucous membrane liquid: 15 milliliter(s) mucous membrane 2 times a day  finasteride 5 mg oral tablet: 1 tab(s) orally once a day  fluticasone 50 mcg/inh nasal spray: 1 spray(s) nasal once a day  montelukast 10 mg oral tablet: 1 tab(s) orally once a day  Rolling walker: R53.1  [place] Blue Itchy Dry Scalp 1% topical shampoo: Apply topically to affected area 3 times a week  Systane Ultra ophthalmic solution: 1 drop(s) to each affected eye 3 times a day  tamsulosin 0.4 mg oral capsule: 1 cap(s) orally once a day  Thera-M oral tablet: 1 tab(s) orally once a day  traZODone 50 mg oral tablet: 1 tab(s) orally once a day  trifluoperazine 5 mg oral tablet: 1 tab(s) orally once a day   Albuterol (Eqv-Proventil HFA) 90 mcg/inh inhalation aerosol: 2 puff(s) inhaled every 6 hours, As Needed  aspirin 81 mg oral tablet: 1 tab(s) orally once a day  benzonatate 100 mg oral capsule: 1 cap(s) orally 3 times a day  chlorhexidine 0.12% mucous membrane liquid: 15 milliliter(s) mucous membrane 2 times a day  finasteride 5 mg oral tablet: 1 tab(s) orally once a day  fluticasone 50 mcg/inh nasal spray: 1 spray(s) nasal once a day  lisinopril 10 mg oral tablet: 1 tab(s) orally once a day  montelukast 10 mg oral tablet: 1 tab(s) orally once a day  Rolling walker: R53.1  [place] Blue Itchy Dry Scalp 1% topical shampoo: Apply topically to affected area 3 times a week  Systane Ultra ophthalmic solution: 1 drop(s) to each affected eye 3 times a day  tamsulosin 0.4 mg oral capsule: 1 cap(s) orally once a day  Thera-M oral tablet: 1 tab(s) orally once a day  traZODone 50 mg oral tablet: 1 tab(s) orally once a day  trifluoperazine 5 mg oral tablet: 1 tab(s) orally once a day

## 2023-02-04 NOTE — PHYSICAL THERAPY INITIAL EVALUATION ADULT - PERTINENT HX OF CURRENT PROBLEM, REHAB EVAL
77M hx asthma, developmental delay (nonverbal at baseline), BPH, glaucoma, BIBEMS for nonbloody diarrhea, vomiting, and respiratory distress since yesterday.  EMS reports upon their arrival he was 89% on RA, 92% on NC. placed pt on NRB. Per pt's escort from facility, symptoms began yesterday. States that 2 other people at facility have similar symptoms. Escort states that at baseline pt is unable to communicate pain to staff. ROS otherwise unobtainable due to mental status but patient appears comfortable, breathing without accessory muscle use or cough. Please refer to H&P on [place] for remaining.

## 2023-02-04 NOTE — PROGRESS NOTE ADULT - PROBLEM SELECTOR PLAN 6
Admission K 3.0. ECG with no T wave changes, no U waves. s/p KCl 40 mEq po & 30 mEq IV in ED  - trend BMP    #hypomagnesemia  Admission Mg 1.7. s/p MgSO4 2g x1  - trend BMP RESOLVED. Admission K 3.0. ECG with no T wave changes, no U waves. s/p KCl 40 mEq po & 30 mEq IV in ED  - trend BMP    #hypomagnesemia  RESOLVED. Admission Mg 1.7. s/p MgSO4 2g x1  - trend BMP    #hypophosphatemia  Phos 1 this AM.  - repleted with KPhos

## 2023-02-04 NOTE — DISCHARGE NOTE PROVIDER - NSDCCPCAREPLAN_GEN_ALL_CORE_FT
PRINCIPAL DISCHARGE DIAGNOSIS  Diagnosis: Nausea, vomiting and diarrhea  Assessment and Plan of Treatment: Food poisoning is an illness that can cause nausea, vomiting, or diarrhea. Food poisoning is caused by eating food that contains germs, such as bacteria, viruses, or parasites. One of the most common causes of food poisoning is norovirus. You tested positive for norovirus during this visit. In order to feel better, you should drink enough liquids so that your body does not get "dehydrated." Dehydration is when the body loses too much water. Eat small meals that do not have a lot of fat in them. Rest, if you feel tired. Follow up with your PCP to ensure that your electrolytes are within normal limits after discharge.      SECONDARY DISCHARGE DIAGNOSES  Diagnosis: Abnormal finding on CT scan  Assessment and Plan of Treatment: You were found to have a mass on imaging that is concerning for cancer. We recommend surgical removal of the mass and close outpatient follow up with your PCP and oncologist to monitor.     PRINCIPAL DISCHARGE DIAGNOSIS  Diagnosis: Nausea, vomiting and diarrhea  Assessment and Plan of Treatment: Food poisoning is an illness that can cause nausea, vomiting, or diarrhea. Food poisoning is caused by eating food that contains germs, such as bacteria, viruses, or parasites. One of the most common causes of food poisoning is norovirus. You tested positive for norovirus during this visit. In order to feel better, you should drink enough liquids so that your body does not get "dehydrated." Dehydration is when the body loses too much water. Eat small meals that do not have a lot of fat in them. Rest, if you feel tired. Follow up with your PCP to ensure that your electrolytes are within normal limits after discharge.      SECONDARY DISCHARGE DIAGNOSES  Diagnosis: Abnormal finding on CT scan  Assessment and Plan of Treatment: You were found to have a mass on imaging that is concerning for cancer. We recommend surgical removal of the mass and close outpatient follow up with your PCP and GI to monitor. You will be contacted by our GI team regarding an appointent for a colonoscopy to biopsy the mass for further workup.    Diagnosis: Hypertension  Assessment and Plan of Treatment: While in the hospital you wer found to have persistnetly elevated blood pressure. You were started on a new medication called lisinopril which significantly improved your blood pressure. You will continue to take this medication upon discharge and will need to follow-up with your PCP for continued management and adjustment of your medication.

## 2023-02-04 NOTE — PHYSICAL THERAPY INITIAL EVALUATION ADULT - GAIT DEVIATIONS NOTED, PT EVAL
fairly steady gait, no LOB/knee buckling noted/decreased vicki/decreased step length fairly steady gait, no LOB/knee buckling noted; *increased time required with turning/decreased vicki/decreased step length

## 2023-02-04 NOTE — PROGRESS NOTE ADULT - PROBLEM SELECTOR PLAN 2
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 Escort notes slight weight loss in the patient in the past few months. Unknown colonoscopy history.  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 Escort notes slight weight loss in the patient in the past few months. Unknown colonoscopy history.  - GI consulted, YA

## 2023-02-04 NOTE — PHYSICAL THERAPY INITIAL EVALUATION ADULT - ORIENTATION, REHAB EVAL
Correctly states "[facility]" without verbal cueing required; oriented to correct birthdate and year; unable to provide currently date however correctly states "it's winter time"/person/place

## 2023-02-05 LAB
ANION GAP SERPL CALC-SCNC: 5 MMOL/L — SIGNIFICANT CHANGE UP (ref 5–17)
BUN SERPL-MCNC: 20 MG/DL — SIGNIFICANT CHANGE UP (ref 7–23)
CALCIUM SERPL-MCNC: 7.7 MG/DL — LOW (ref 8.4–10.5)
CHLORIDE SERPL-SCNC: 109 MMOL/L — HIGH (ref 96–108)
CO2 SERPL-SCNC: 26 MMOL/L — SIGNIFICANT CHANGE UP (ref 22–31)
CREAT SERPL-MCNC: 0.85 MG/DL — SIGNIFICANT CHANGE UP (ref 0.5–1.3)
EGFR: 89 ML/MIN/1.73M2 — SIGNIFICANT CHANGE UP
GLUCOSE SERPL-MCNC: 90 MG/DL — SIGNIFICANT CHANGE UP (ref 70–99)
HCT VFR BLD CALC: 33.5 % — LOW (ref 39–50)
HGB BLD-MCNC: 11 G/DL — LOW (ref 13–17)
MAGNESIUM SERPL-MCNC: 1.9 MG/DL — SIGNIFICANT CHANGE UP (ref 1.6–2.6)
MCHC RBC-ENTMCNC: 32.8 GM/DL — SIGNIFICANT CHANGE UP (ref 32–36)
MCHC RBC-ENTMCNC: 32.8 PG — SIGNIFICANT CHANGE UP (ref 27–34)
MCV RBC AUTO: 100 FL — SIGNIFICANT CHANGE UP (ref 80–100)
NRBC # BLD: 0 /100 WBCS — SIGNIFICANT CHANGE UP (ref 0–0)
PHOSPHATE SERPL-MCNC: 2.5 MG/DL — SIGNIFICANT CHANGE UP (ref 2.5–4.5)
PLATELET # BLD AUTO: 121 K/UL — LOW (ref 150–400)
POTASSIUM SERPL-MCNC: 4.5 MMOL/L — SIGNIFICANT CHANGE UP (ref 3.5–5.3)
POTASSIUM SERPL-SCNC: 4.5 MMOL/L — SIGNIFICANT CHANGE UP (ref 3.5–5.3)
RBC # BLD: 3.35 M/UL — LOW (ref 4.2–5.8)
RBC # FLD: 13.3 % — SIGNIFICANT CHANGE UP (ref 10.3–14.5)
SODIUM SERPL-SCNC: 140 MMOL/L — SIGNIFICANT CHANGE UP (ref 135–145)
WBC # BLD: 6.13 K/UL — SIGNIFICANT CHANGE UP (ref 3.8–10.5)
WBC # FLD AUTO: 6.13 K/UL — SIGNIFICANT CHANGE UP (ref 3.8–10.5)

## 2023-02-05 PROCEDURE — 99233 SBSQ HOSP IP/OBS HIGH 50: CPT | Mod: GC

## 2023-02-05 RX ORDER — LISINOPRIL 2.5 MG/1
10 TABLET ORAL DAILY
Refills: 0 | Status: DISCONTINUED | OUTPATIENT
Start: 2023-02-05 | End: 2023-02-09

## 2023-02-05 RX ADMIN — ENOXAPARIN SODIUM 40 MILLIGRAM(S): 100 INJECTION SUBCUTANEOUS at 09:44

## 2023-02-05 RX ADMIN — FLUPHENAZINE HYDROCHLORIDE 2.5 MILLIGRAM(S): 1 TABLET, FILM COATED ORAL at 22:35

## 2023-02-05 RX ADMIN — TAMSULOSIN HYDROCHLORIDE 0.4 MILLIGRAM(S): 0.4 CAPSULE ORAL at 22:23

## 2023-02-05 RX ADMIN — SODIUM CHLORIDE 80 MILLILITER(S): 9 INJECTION, SOLUTION INTRAVENOUS at 01:14

## 2023-02-05 RX ADMIN — LISINOPRIL 10 MILLIGRAM(S): 2.5 TABLET ORAL at 10:15

## 2023-02-05 RX ADMIN — Medication 1 DROP(S): at 22:24

## 2023-02-05 RX ADMIN — CHLORHEXIDINE GLUCONATE 15 MILLILITER(S): 213 SOLUTION TOPICAL at 06:58

## 2023-02-05 RX ADMIN — CHLORHEXIDINE GLUCONATE 15 MILLILITER(S): 213 SOLUTION TOPICAL at 18:05

## 2023-02-05 RX ADMIN — Medication 1 DROP(S): at 09:44

## 2023-02-05 RX ADMIN — FLUPHENAZINE HYDROCHLORIDE 2.5 MILLIGRAM(S): 1 TABLET, FILM COATED ORAL at 01:13

## 2023-02-05 RX ADMIN — Medication 1 SPRAY(S): at 22:23

## 2023-02-05 RX ADMIN — FINASTERIDE 5 MILLIGRAM(S): 5 TABLET, FILM COATED ORAL at 09:46

## 2023-02-05 RX ADMIN — Medication 1 SPRAY(S): at 09:44

## 2023-02-05 RX ADMIN — Medication 81 MILLIGRAM(S): at 12:04

## 2023-02-05 RX ADMIN — MONTELUKAST 10 MILLIGRAM(S): 4 TABLET, CHEWABLE ORAL at 09:44

## 2023-02-05 RX ADMIN — Medication 50 MILLIGRAM(S): at 22:23

## 2023-02-05 NOTE — PROGRESS NOTE ADULT - PROBLEM SELECTOR PLAN 1
Patient with 1 day of nonbloody vomiting and diarrhea, history of similar symptoms with group home residents. Patient not meeting sepsis criteria.  CT A/P showed ileus with findings c/w history of diarrhea. CT with significant stool burden.  - GI PCR positive for norovirus  - blood cultures NGTD

## 2023-02-05 NOTE — PROGRESS NOTE ADULT - PROBLEM SELECTOR PLAN 2
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 Escort notes slight weight loss in the patient in the past few months. Unknown colonoscopy history.  - GI consulted, YA

## 2023-02-05 NOTE — PROGRESS NOTE ADULT - SUBJECTIVE AND OBJECTIVE BOX
Patient is a 77y old  Male who presents with a chief complaint of Acute gastroenteritis (04 Feb 2023 11:54)      INTERVAL HPI/OVERNIGHT EVENTS: offers no new complaints; current symptoms resolving    MEDICATIONS  (STANDING):  artificial  tears Solution 1 Drop(s) Both EYES every 12 hours  aspirin  chewable 81 milliGRAM(s) Oral daily  chlorhexidine 0.12% Liquid 15 milliLiter(s) Oral Mucosa two times a day  enoxaparin Injectable 40 milliGRAM(s) SubCutaneous every 24 hours  finasteride 5 milliGRAM(s) Oral every 24 hours  fluPHENAZine 2.5 milliGRAM(s) Oral every 12 hours  fluticasone propionate 50 MICROgram(s)/spray Nasal Spray 1 Spray(s) Both Nostrils every 12 hours  lactated ringers. 1000 milliLiter(s) (80 mL/Hr) IV Continuous <Continuous>  lisinopril 10 milliGRAM(s) Oral daily  montelukast 10 milliGRAM(s) Oral every 24 hours  multivitamin/minerals 1 Tablet(s) Oral every 24 hours  tamsulosin 0.4 milliGRAM(s) Oral at bedtime  traZODone 50 milliGRAM(s) Oral at bedtime    MEDICATIONS  (PRN):  acetaminophen     Tablet .. 650 milliGRAM(s) Oral every 6 hours PRN Temp greater or equal to 38C (100.4F), Mild Pain (1 - 3)  albuterol    90 MICROgram(s) HFA Inhaler 2 Puff(s) Inhalation every 6 hours PRN Shortness of Breath and/or Wheezing      __________________________________________________  REVIEW OF SYSTEMS:    CONSTITUTIONAL: No fever,   EYES: no acute visual disturbances  NECK: No pain or stiffness  RESPIRATORY: No cough; No shortness of breath  CARDIOVASCULAR: No chest pain, no palpitations  GASTROINTESTINAL: No pain. No nausea or vomiting; No diarrhea   NEUROLOGICAL: No headache or numbness, no tremors  MUSCULOSKELETAL: No joint pain, no muscle pain  GENITOURINARY: no dysuria, no frequency, no hesitancy  PSYCHIATRY: no depression , no anxiety  ALL OTHER  ROS negative        Vital Signs Last 24 Hrs  T(C): 37.2 (05 Feb 2023 14:00), Max: 37.2 (05 Feb 2023 14:00)  T(F): 99 (05 Feb 2023 14:00), Max: 99 (05 Feb 2023 14:00)  HR: 66 (05 Feb 2023 14:00) (66 - 72)  BP: 148/70 (05 Feb 2023 14:00) (148/70 - 187/92)  BP(mean): --  RR: 19 (05 Feb 2023 14:00) (17 - 19)  SpO2: 95% (05 Feb 2023 14:00) (93% - 95%)    Parameters below as of 05 Feb 2023 14:00  Patient On (Oxygen Delivery Method): room air        ________________________________________________  PHYSICAL EXAM:  GENERAL: NAD  HEENT: Normocephalic;  conjunctivae and sclerae clear; moist mucous membranes;   NECK : supple  CHEST/LUNG: Clear to auscultation bilaterally with good air entry   HEART: S1 S2  regular; no murmurs, gallops or rubs  ABDOMEN: Soft, Nontender,+distended; Bowel sounds present  EXTREMITIES: no cyanosis; no edema; no calf tenderness  SKIN: warm and dry; no rash  NERVOUS SYSTEM:  Awake and alert; Oriented  to person     _________________________________________________  LABS:                        11.0   6.13  )-----------( 121      ( 05 Feb 2023 06:25 )             33.5     02-05    140  |  109<H>  |  20  ----------------------------<  90  4.5   |  26  |  0.85    Ca    7.7<L>      05 Feb 2023 06:25  Phos  2.5     02-05  Mg     1.9     02-05    TPro  5.9<L>  /  Alb  3.0<L>  /  TBili  0.2  /  DBili  x   /  AST  29  /  ALT  20  /  AlkPhos  60  02-04        CAPILLARY BLOOD GLUCOSE            RADIOLOGY & ADDITIONAL TESTS:      Plan of care was discussed with patient and /or primary care giver; all questions and concerns were addressed and care was aligned with patient's wishes.

## 2023-02-05 NOTE — PROGRESS NOTE ADULT - PROBLEM SELECTOR PLAN 6
RESOLVED. Admission K 3.0. ECG with no T wave changes, no U waves. s/p KCl 40 mEq po & 30 mEq IV in ED  - trend BMP    #hypomagnesemia  RESOLVED. Admission Mg 1.7. s/p MgSO4 2g x1  - trend BMP    #hypophosphatemia  - repleted with KPhos

## 2023-02-06 LAB
ANION GAP SERPL CALC-SCNC: 8 MMOL/L — SIGNIFICANT CHANGE UP (ref 5–17)
BLD GP AB SCN SERPL QL: NEGATIVE — SIGNIFICANT CHANGE UP
BUN SERPL-MCNC: 15 MG/DL — SIGNIFICANT CHANGE UP (ref 7–23)
CALCIUM SERPL-MCNC: 8.5 MG/DL — SIGNIFICANT CHANGE UP (ref 8.4–10.5)
CHLORIDE SERPL-SCNC: 102 MMOL/L — SIGNIFICANT CHANGE UP (ref 96–108)
CO2 SERPL-SCNC: 26 MMOL/L — SIGNIFICANT CHANGE UP (ref 22–31)
CREAT SERPL-MCNC: 0.77 MG/DL — SIGNIFICANT CHANGE UP (ref 0.5–1.3)
EGFR: 92 ML/MIN/1.73M2 — SIGNIFICANT CHANGE UP
GLUCOSE SERPL-MCNC: 96 MG/DL — SIGNIFICANT CHANGE UP (ref 70–99)
HCT VFR BLD CALC: 35.5 % — LOW (ref 39–50)
HGB BLD-MCNC: 11.9 G/DL — LOW (ref 13–17)
MAGNESIUM SERPL-MCNC: 1.8 MG/DL — SIGNIFICANT CHANGE UP (ref 1.6–2.6)
MCHC RBC-ENTMCNC: 32.6 PG — SIGNIFICANT CHANGE UP (ref 27–34)
MCHC RBC-ENTMCNC: 33.5 GM/DL — SIGNIFICANT CHANGE UP (ref 32–36)
MCV RBC AUTO: 97.3 FL — SIGNIFICANT CHANGE UP (ref 80–100)
NRBC # BLD: 0 /100 WBCS — SIGNIFICANT CHANGE UP (ref 0–0)
PHOSPHATE SERPL-MCNC: 2.6 MG/DL — SIGNIFICANT CHANGE UP (ref 2.5–4.5)
PLATELET # BLD AUTO: 139 K/UL — LOW (ref 150–400)
POTASSIUM SERPL-MCNC: 4.1 MMOL/L — SIGNIFICANT CHANGE UP (ref 3.5–5.3)
POTASSIUM SERPL-SCNC: 4.1 MMOL/L — SIGNIFICANT CHANGE UP (ref 3.5–5.3)
RBC # BLD: 3.65 M/UL — LOW (ref 4.2–5.8)
RBC # FLD: 12.7 % — SIGNIFICANT CHANGE UP (ref 10.3–14.5)
RH IG SCN BLD-IMP: POSITIVE — SIGNIFICANT CHANGE UP
SODIUM SERPL-SCNC: 136 MMOL/L — SIGNIFICANT CHANGE UP (ref 135–145)
WBC # BLD: 4.83 K/UL — SIGNIFICANT CHANGE UP (ref 3.8–10.5)
WBC # FLD AUTO: 4.83 K/UL — SIGNIFICANT CHANGE UP (ref 3.8–10.5)

## 2023-02-06 PROCEDURE — 99233 SBSQ HOSP IP/OBS HIGH 50: CPT | Mod: GC

## 2023-02-06 RX ORDER — MULTIVIT-MIN/FERROUS GLUCONATE 9 MG/15 ML
1 LIQUID (ML) ORAL
Qty: 0 | Refills: 0 | DISCHARGE

## 2023-02-06 RX ORDER — MONTELUKAST 4 MG/1
1 TABLET, CHEWABLE ORAL
Qty: 0 | Refills: 0 | DISCHARGE

## 2023-02-06 RX ORDER — PYRITHIONE ZINC 1 %
1 SHAMPOO TOPICAL
Qty: 0 | Refills: 0 | DISCHARGE

## 2023-02-06 RX ORDER — TRIFLUOPERAZINE HCL 5 MG
1 TABLET ORAL
Qty: 0 | Refills: 0 | DISCHARGE

## 2023-02-06 RX ORDER — TRAZODONE HCL 50 MG
1 TABLET ORAL
Qty: 0 | Refills: 0 | DISCHARGE

## 2023-02-06 RX ORDER — ALBUTEROL 90 UG/1
2 AEROSOL, METERED ORAL
Qty: 0 | Refills: 0 | DISCHARGE

## 2023-02-06 RX ORDER — TAMSULOSIN HYDROCHLORIDE 0.4 MG/1
1 CAPSULE ORAL
Qty: 0 | Refills: 0 | DISCHARGE

## 2023-02-06 RX ORDER — FLUTICASONE PROPIONATE 50 MCG
1 SPRAY, SUSPENSION NASAL
Qty: 0 | Refills: 0 | DISCHARGE

## 2023-02-06 RX ORDER — MAGNESIUM SULFATE 500 MG/ML
2 VIAL (ML) INJECTION ONCE
Refills: 0 | Status: COMPLETED | OUTPATIENT
Start: 2023-02-06 | End: 2023-02-06

## 2023-02-06 RX ORDER — ASPIRIN/CALCIUM CARB/MAGNESIUM 324 MG
1 TABLET ORAL
Qty: 0 | Refills: 0 | DISCHARGE

## 2023-02-06 RX ORDER — FINASTERIDE 5 MG/1
1 TABLET, FILM COATED ORAL
Qty: 0 | Refills: 0 | DISCHARGE

## 2023-02-06 RX ORDER — CHLORHEXIDINE GLUCONATE 213 G/1000ML
15 SOLUTION TOPICAL
Qty: 0 | Refills: 0 | DISCHARGE

## 2023-02-06 RX ADMIN — FINASTERIDE 5 MILLIGRAM(S): 5 TABLET, FILM COATED ORAL at 09:24

## 2023-02-06 RX ADMIN — CHLORHEXIDINE GLUCONATE 15 MILLILITER(S): 213 SOLUTION TOPICAL at 07:04

## 2023-02-06 RX ADMIN — Medication 1 TABLET(S): at 09:24

## 2023-02-06 RX ADMIN — ENOXAPARIN SODIUM 40 MILLIGRAM(S): 100 INJECTION SUBCUTANEOUS at 09:24

## 2023-02-06 RX ADMIN — Medication 1 SPRAY(S): at 09:25

## 2023-02-06 RX ADMIN — Medication 25 GRAM(S): at 09:23

## 2023-02-06 RX ADMIN — LISINOPRIL 10 MILLIGRAM(S): 2.5 TABLET ORAL at 07:04

## 2023-02-06 RX ADMIN — Medication 1 DROP(S): at 21:08

## 2023-02-06 RX ADMIN — TAMSULOSIN HYDROCHLORIDE 0.4 MILLIGRAM(S): 0.4 CAPSULE ORAL at 22:54

## 2023-02-06 RX ADMIN — Medication 650 MILLIGRAM(S): at 23:53

## 2023-02-06 RX ADMIN — Medication 1 SPRAY(S): at 21:08

## 2023-02-06 RX ADMIN — Medication 1 DROP(S): at 09:24

## 2023-02-06 RX ADMIN — MONTELUKAST 10 MILLIGRAM(S): 4 TABLET, CHEWABLE ORAL at 09:24

## 2023-02-06 RX ADMIN — Medication 650 MILLIGRAM(S): at 22:53

## 2023-02-06 RX ADMIN — Medication 50 MILLIGRAM(S): at 22:54

## 2023-02-06 RX ADMIN — FLUPHENAZINE HYDROCHLORIDE 2.5 MILLIGRAM(S): 1 TABLET, FILM COATED ORAL at 22:53

## 2023-02-06 RX ADMIN — Medication 81 MILLIGRAM(S): at 13:21

## 2023-02-06 RX ADMIN — FLUPHENAZINE HYDROCHLORIDE 2.5 MILLIGRAM(S): 1 TABLET, FILM COATED ORAL at 09:23

## 2023-02-06 NOTE — PROGRESS NOTE ADULT - PROBLEM SELECTOR PLAN 1
Patient with 1 day of nonbloody vomiting and diarrhea, history of similar symptoms with group home residents. Patient not meeting sepsis criteria.  CT A/P showed ileus with findings c/w history of diarrhea. CT with significant stool burden.  - GI PCR positive for norovirus  - blood cultures NGTD Patient with 1 day of nonbloody vomiting and diarrhea, history of similar symptoms with group home residents. Patient not meeting sepsis criteria. CT A/P showed ileus with findings c/w history of diarrhea. CT with significant stool burden.  - GI PCR positive for norovirus  - blood cultures NGTD

## 2023-02-06 NOTE — PROGRESS NOTE ADULT - PROBLEM SELECTOR PLAN 5
Admission HCO3 20 with normal anion gap consistent with history of diarrhea. s/p 2L NS in ED. Dry mucous membranes on exam.  - gentle maintenance fluids  - encourage oral hydration  - trend BMP RESOLVED. Admission HCO3 20 with normal anion gap consistent with history of diarrhea. s/p 2L NS in ED. Dry mucous membranes on exam.  - gentle maintenance fluids  - encourage oral hydration  - trend BMP

## 2023-02-06 NOTE — PROGRESS NOTE ADULT - SUBJECTIVE AND OBJECTIVE BOX
KASANDRA ISLAS  77y, Male    Patient is a 77y old  Male who presents with a chief complaint of Acute gastroenteritis (04 Feb 2023 11:54)      INTERVAL HPI/OVERNIGHT EVENTS:    ROS: otherwise negative      T(C): 36.5 (02-05-23 @ 21:41), Max: 37.2 (02-05-23 @ 14:00)  HR: 61 (02-05-23 @ 21:41) (61 - 72)  BP: 139/67 (02-05-23 @ 21:41) (139/67 - 187/92)  RR: 18 (02-05-23 @ 21:41) (18 - 19)  SpO2: 96% (02-05-23 @ 21:41) (95% - 96%)  Wt(kg): --Vital Signs Last 24 Hrs  T(C): 36.5 (05 Feb 2023 21:41), Max: 37.2 (05 Feb 2023 14:00)  T(F): 97.7 (05 Feb 2023 21:41), Max: 99 (05 Feb 2023 14:00)  HR: 61 (05 Feb 2023 21:41) (61 - 72)  BP: 139/67 (05 Feb 2023 21:41) (139/67 - 187/92)  BP(mean): --  RR: 18 (05 Feb 2023 21:41) (18 - 19)  SpO2: 96% (05 Feb 2023 21:41) (95% - 96%)    Parameters below as of 05 Feb 2023 21:41  Patient On (Oxygen Delivery Method): room air        PHYSICAL EXAM:  Constitutional: resting comfortably in bed; NAD  Head: NC/AT  Eyes: PERRL, EOMI, anicteric sclera  ENT: no nasal discharge; MMM  Neck: supple; no JVD or thyromegaly  Respiratory: CTA B/L; no W/R/R, no retractions  Cardiac: +S1/S2; RRR; no M/R/G; PMI non-displaced  Gastrointestinal: soft, NT/ND; no rebound or guarding; +BSx4  Back: spine midline, no bony tenderness or step-offs; no CVAT B/L  Extremities: WWP, no clubbing or cyanosis; no peripheral edema. Capillary refill <2 sec  Musculoskeletal: NROM x4; no joint swelling, tenderness or erythema  Vascular: 2+ radial, DP/PT pulses B/L  Dermatologic: skin warm, dry and intact; no rashes, wounds, or scars  Lymphatic: no submandibular or cervical LAD  Neurologic: AAOx3; CNII-XII grossly intact; no focal deficits  Psychiatric: affect and characteristics of appearance, verbalizations, behaviors are appropriate    LABS:                        11.0   6.13  )-----------( 121      ( 05 Feb 2023 06:25 )             33.5     02-05    140  |  109<H>  |  20  ----------------------------<  90  4.5   |  26  |  0.85    Ca    7.7<L>      05 Feb 2023 06:25  Phos  2.5     02-05  Mg     1.9     02-05            CAPILLARY BLOOD GLUCOSE                MEDICATIONS  (STANDING):  artificial  tears Solution 1 Drop(s) Both EYES every 12 hours  aspirin  chewable 81 milliGRAM(s) Oral daily  chlorhexidine 0.12% Liquid 15 milliLiter(s) Oral Mucosa two times a day  enoxaparin Injectable 40 milliGRAM(s) SubCutaneous every 24 hours  finasteride 5 milliGRAM(s) Oral every 24 hours  fluPHENAZine 2.5 milliGRAM(s) Oral every 12 hours  fluticasone propionate 50 MICROgram(s)/spray Nasal Spray 1 Spray(s) Both Nostrils every 12 hours  lactated ringers. 1000 milliLiter(s) (80 mL/Hr) IV Continuous <Continuous>  lisinopril 10 milliGRAM(s) Oral daily  montelukast 10 milliGRAM(s) Oral every 24 hours  multivitamin/minerals 1 Tablet(s) Oral every 24 hours  tamsulosin 0.4 milliGRAM(s) Oral at bedtime  traZODone 50 milliGRAM(s) Oral at bedtime    MEDICATIONS  (PRN):  acetaminophen     Tablet .. 650 milliGRAM(s) Oral every 6 hours PRN Temp greater or equal to 38C (100.4F), Mild Pain (1 - 3)  albuterol    90 MICROgram(s) HFA Inhaler 2 Puff(s) Inhalation every 6 hours PRN Shortness of Breath and/or Wheezing      RADIOLOGY & ADDITIONAL TESTS: Reviewed   KASANDRA ISLAS  77y, Male    Patient is a 77y old  Male who presents with a chief complaint of Acute gastroenteritis (04 Feb 2023 11:54)      INTERVAL HPI/OVERNIGHT EVENTS: YA overnight. Patient seen and examined at bedside.     ROS: otherwise negative      T(C): 36.5 (02-05-23 @ 21:41), Max: 37.2 (02-05-23 @ 14:00)  HR: 61 (02-05-23 @ 21:41) (61 - 72)  BP: 139/67 (02-05-23 @ 21:41) (139/67 - 187/92)  RR: 18 (02-05-23 @ 21:41) (18 - 19)  SpO2: 96% (02-05-23 @ 21:41) (95% - 96%)  Wt(kg): --Vital Signs Last 24 Hrs  T(C): 36.5 (05 Feb 2023 21:41), Max: 37.2 (05 Feb 2023 14:00)  T(F): 97.7 (05 Feb 2023 21:41), Max: 99 (05 Feb 2023 14:00)  HR: 61 (05 Feb 2023 21:41) (61 - 72)  BP: 139/67 (05 Feb 2023 21:41) (139/67 - 187/92)  BP(mean): --  RR: 18 (05 Feb 2023 21:41) (18 - 19)  SpO2: 96% (05 Feb 2023 21:41) (95% - 96%)    Parameters below as of 05 Feb 2023 21:41  Patient On (Oxygen Delivery Method): room air        PHYSICAL EXAM:  Constitutional: resting comfortably in bed; NAD  Head: NC/AT  Eyes: PERRL, EOMI, anicteric sclera  ENT: no nasal discharge; MMM  Neck: supple; no JVD or thyromegaly  Respiratory: CTA B/L; no W/R/R, no retractions  Cardiac: +S1/S2; RRR; no M/R/G; PMI non-displaced  Gastrointestinal: soft, NT/ND; no rebound or guarding; +BSx4  Back: spine midline, no bony tenderness or step-offs; no CVAT B/L  Extremities: WWP, no clubbing or cyanosis; no peripheral edema. Capillary refill <2 sec  Musculoskeletal: NROM x4; no joint swelling, tenderness or erythema  Vascular: 2+ radial, DP/PT pulses B/L  Dermatologic: skin warm, dry and intact; no rashes, wounds, or scars  Lymphatic: no submandibular or cervical LAD  Neurologic: AAOx3; CNII-XII grossly intact; no focal deficits  Psychiatric: affect and characteristics of appearance, verbalizations, behaviors are appropriate    LABS:                        11.0   6.13  )-----------( 121      ( 05 Feb 2023 06:25 )             33.5     02-05    140  |  109<H>  |  20  ----------------------------<  90  4.5   |  26  |  0.85    Ca    7.7<L>      05 Feb 2023 06:25  Phos  2.5     02-05  Mg     1.9     02-05            CAPILLARY BLOOD GLUCOSE                MEDICATIONS  (STANDING):  artificial  tears Solution 1 Drop(s) Both EYES every 12 hours  aspirin  chewable 81 milliGRAM(s) Oral daily  chlorhexidine 0.12% Liquid 15 milliLiter(s) Oral Mucosa two times a day  enoxaparin Injectable 40 milliGRAM(s) SubCutaneous every 24 hours  finasteride 5 milliGRAM(s) Oral every 24 hours  fluPHENAZine 2.5 milliGRAM(s) Oral every 12 hours  fluticasone propionate 50 MICROgram(s)/spray Nasal Spray 1 Spray(s) Both Nostrils every 12 hours  lactated ringers. 1000 milliLiter(s) (80 mL/Hr) IV Continuous <Continuous>  lisinopril 10 milliGRAM(s) Oral daily  montelukast 10 milliGRAM(s) Oral every 24 hours  multivitamin/minerals 1 Tablet(s) Oral every 24 hours  tamsulosin 0.4 milliGRAM(s) Oral at bedtime  traZODone 50 milliGRAM(s) Oral at bedtime    MEDICATIONS  (PRN):  acetaminophen     Tablet .. 650 milliGRAM(s) Oral every 6 hours PRN Temp greater or equal to 38C (100.4F), Mild Pain (1 - 3)  albuterol    90 MICROgram(s) HFA Inhaler 2 Puff(s) Inhalation every 6 hours PRN Shortness of Breath and/or Wheezing      RADIOLOGY & ADDITIONAL TESTS: Reviewed   KASANDRA ISLAS  77y, Male    Patient is a 77y old  Male who presents with a chief complaint of Acute gastroenteritis (04 Feb 2023 11:54)      INTERVAL HPI/OVERNIGHT EVENTS: YA overnight. Patient seen and examined at bedside. No changes, answers questions with on-word statements. Denies pain.     ROS: otherwise negative      T(C): 36.5 (02-05-23 @ 21:41), Max: 37.2 (02-05-23 @ 14:00)  HR: 61 (02-05-23 @ 21:41) (61 - 72)  BP: 139/67 (02-05-23 @ 21:41) (139/67 - 187/92)  RR: 18 (02-05-23 @ 21:41) (18 - 19)  SpO2: 96% (02-05-23 @ 21:41) (95% - 96%)  Wt(kg): --Vital Signs Last 24 Hrs  T(C): 36.5 (05 Feb 2023 21:41), Max: 37.2 (05 Feb 2023 14:00)  T(F): 97.7 (05 Feb 2023 21:41), Max: 99 (05 Feb 2023 14:00)  HR: 61 (05 Feb 2023 21:41) (61 - 72)  BP: 139/67 (05 Feb 2023 21:41) (139/67 - 187/92)  BP(mean): --  RR: 18 (05 Feb 2023 21:41) (18 - 19)  SpO2: 96% (05 Feb 2023 21:41) (95% - 96%)    Parameters below as of 05 Feb 2023 21:41  Patient On (Oxygen Delivery Method): room air        PHYSICAL EXAM:  Constitutional: resting comfortably in bed; NAD  Head: NC/AT  Eyes: PERRL, EOMI, anicteric sclera  ENT: no nasal discharge; MMM  Neck: supple; no JVD or thyromegaly  Respiratory: CTA B/L; no W/R/R, no retractions  Cardiac: +S1/S2; RRR; no M/R/G  Gastrointestinal: soft, NT/ND; no rebound or guarding; +BSx4  Extremities: WWP, no clubbing or cyanosis; no peripheral edema.   Musculoskeletal: NROM x4; no joint swelling, tenderness or erythema  Vascular: 2+ radial, DP/PT pulses B/L  Neurologic: AAOx1; CNII-XII grossly intact; no focal deficits    LABS:                        11.0   6.13  )-----------( 121      ( 05 Feb 2023 06:25 )             33.5     02-05    140  |  109<H>  |  20  ----------------------------<  90  4.5   |  26  |  0.85    Ca    7.7<L>      05 Feb 2023 06:25  Phos  2.5     02-05  Mg     1.9     02-05            CAPILLARY BLOOD GLUCOSE                MEDICATIONS  (STANDING):  artificial  tears Solution 1 Drop(s) Both EYES every 12 hours  aspirin  chewable 81 milliGRAM(s) Oral daily  chlorhexidine 0.12% Liquid 15 milliLiter(s) Oral Mucosa two times a day  enoxaparin Injectable 40 milliGRAM(s) SubCutaneous every 24 hours  finasteride 5 milliGRAM(s) Oral every 24 hours  fluPHENAZine 2.5 milliGRAM(s) Oral every 12 hours  fluticasone propionate 50 MICROgram(s)/spray Nasal Spray 1 Spray(s) Both Nostrils every 12 hours  lactated ringers. 1000 milliLiter(s) (80 mL/Hr) IV Continuous <Continuous>  lisinopril 10 milliGRAM(s) Oral daily  montelukast 10 milliGRAM(s) Oral every 24 hours  multivitamin/minerals 1 Tablet(s) Oral every 24 hours  tamsulosin 0.4 milliGRAM(s) Oral at bedtime  traZODone 50 milliGRAM(s) Oral at bedtime    MEDICATIONS  (PRN):  acetaminophen     Tablet .. 650 milliGRAM(s) Oral every 6 hours PRN Temp greater or equal to 38C (100.4F), Mild Pain (1 - 3)  albuterol    90 MICROgram(s) HFA Inhaler 2 Puff(s) Inhalation every 6 hours PRN Shortness of Breath and/or Wheezing      RADIOLOGY & ADDITIONAL TESTS: Reviewed   KASANDRA ISLAS  77y, Male    Patient is a 77y old  Male who presents with a chief complaint of Acute gastroenteritis (04 Feb 2023 11:54)    Hospital course: Patient is a 77 year-old man with PMHx of developmental delay (nonverbal to minimally conversive at baseline), benign prostatic hyperplasia, glaucoma, hypokalemia, normal anion gap metabolic acidosis, hypomagnesemia, hypophosphatemia, who was brought by EMS for non-bloody diarrhea, vomiting, and respiratory distress and was admitted for further management. Patient lives at [facility], contact is [facility] [phone]. On admission, patient initially required 15L NRB and 4L NC before being weaned to room air. Positive for norovirus on GI PCR, supportive management. On 2/3/23, a CT scan was performed, who showed suspicion for cecal carcinoma. GI consulted, recommended colonoscopy and likely local resection; however patient without medical decision-making capacity. Ethics consulted, established that legal guardian is Mark Anthony Zavala [phone]. Unable to reach guardian, but per [facility], guardian does not want patient to have invasive procedures such as colonoscopy. Pending [facility] discussion with guardian.       INTERVAL HPI/OVERNIGHT EVENTS: YA overnight. Patient seen and examined at bedside. No changes, answers questions with on-word statements. Denies pain.     ROS: otherwise negative      T(C): 36.5 (02-05-23 @ 21:41), Max: 37.2 (02-05-23 @ 14:00)  HR: 61 (02-05-23 @ 21:41) (61 - 72)  BP: 139/67 (02-05-23 @ 21:41) (139/67 - 187/92)  RR: 18 (02-05-23 @ 21:41) (18 - 19)  SpO2: 96% (02-05-23 @ 21:41) (95% - 96%)  Wt(kg): --Vital Signs Last 24 Hrs  T(C): 36.5 (05 Feb 2023 21:41), Max: 37.2 (05 Feb 2023 14:00)  T(F): 97.7 (05 Feb 2023 21:41), Max: 99 (05 Feb 2023 14:00)  HR: 61 (05 Feb 2023 21:41) (61 - 72)  BP: 139/67 (05 Feb 2023 21:41) (139/67 - 187/92)  BP(mean): --  RR: 18 (05 Feb 2023 21:41) (18 - 19)  SpO2: 96% (05 Feb 2023 21:41) (95% - 96%)    Parameters below as of 05 Feb 2023 21:41  Patient On (Oxygen Delivery Method): room air        PHYSICAL EXAM:  Constitutional: resting comfortably in bed; NAD  Head: NC/AT  Eyes: PERRL, EOMI, anicteric sclera  ENT: no nasal discharge; MMM  Neck: supple; no JVD or thyromegaly  Respiratory: CTA B/L; no W/R/R, no retractions  Cardiac: +S1/S2; RRR; no M/R/G  Gastrointestinal: soft, NT/ND; no rebound or guarding; +BSx4  Extremities: WWP, no clubbing or cyanosis; no peripheral edema.   Musculoskeletal: NROM x4; no joint swelling, tenderness or erythema  Vascular: 2+ radial, DP/PT pulses B/L  Neurologic: AAOx1; CNII-XII grossly intact; no focal deficits    LABS:                        11.0   6.13  )-----------( 121      ( 05 Feb 2023 06:25 )             33.5     02-05    140  |  109<H>  |  20  ----------------------------<  90  4.5   |  26  |  0.85    Ca    7.7<L>      05 Feb 2023 06:25  Phos  2.5     02-05  Mg     1.9     02-05            CAPILLARY BLOOD GLUCOSE                MEDICATIONS  (STANDING):  artificial  tears Solution 1 Drop(s) Both EYES every 12 hours  aspirin  chewable 81 milliGRAM(s) Oral daily  chlorhexidine 0.12% Liquid 15 milliLiter(s) Oral Mucosa two times a day  enoxaparin Injectable 40 milliGRAM(s) SubCutaneous every 24 hours  finasteride 5 milliGRAM(s) Oral every 24 hours  fluPHENAZine 2.5 milliGRAM(s) Oral every 12 hours  fluticasone propionate 50 MICROgram(s)/spray Nasal Spray 1 Spray(s) Both Nostrils every 12 hours  lactated ringers. 1000 milliLiter(s) (80 mL/Hr) IV Continuous <Continuous>  lisinopril 10 milliGRAM(s) Oral daily  montelukast 10 milliGRAM(s) Oral every 24 hours  multivitamin/minerals 1 Tablet(s) Oral every 24 hours  tamsulosin 0.4 milliGRAM(s) Oral at bedtime  traZODone 50 milliGRAM(s) Oral at bedtime    MEDICATIONS  (PRN):  acetaminophen     Tablet .. 650 milliGRAM(s) Oral every 6 hours PRN Temp greater or equal to 38C (100.4F), Mild Pain (1 - 3)  albuterol    90 MICROgram(s) HFA Inhaler 2 Puff(s) Inhalation every 6 hours PRN Shortness of Breath and/or Wheezing      RADIOLOGY & ADDITIONAL TESTS: Reviewed

## 2023-02-06 NOTE — CONSULT NOTE ADULT - ATTENDING COMMENTS
Patient is developmentally delayed without capacity and protected by guardian. The guardian may consent for colonscopy. Where life threatening interventions become necessary due to diagnosis of malignancy ethics suggests a review of next steps
novovirus from SNF  incidental cecal mass  needs OK from HCP to proceed with colonscopy  supportive care.  Agree with above.

## 2023-02-06 NOTE — PROGRESS NOTE ADULT - PROBLEM SELECTOR PLAN 8
F: s/p NS 2L, LR @80/hr x6 hours  E: replenish K<4, Mg<2  N: Regular  VTE Prophylaxis: Lovenox 40 Q24H  GI: not needed  C: Full Code  D: SILVER F: none  E: replenish K<4, Mg<2  N: Regular  VTE Prophylaxis: Lovenox 40 Q24H  GI: not needed  C: Full Code  D: TAMIR

## 2023-02-06 NOTE — PROGRESS NOTE ADULT - PROBLEM SELECTOR PLAN 2
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 Escort notes slight weight loss in the patient in the past few months. Unknown colonoscopy history.  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 Escort notes slight weight loss in the patient in the past few months. Unknown colonoscopy history.  - GI consulted, f/u recs  - patient's legal guardian is Mark Anthony Zavala [phone]; unable to reach at this time but will reattempt to obtain consent and discuss patient's GOC

## 2023-02-06 NOTE — CONSULT NOTE ADULT - ASSESSMENT
RECOMMENDATION:   Mr. Scott has a legal appointed guardian, Mark Anthony Zavala ([phone]). Consent for medical procedures can be sent to [email or web address]. In case of difficulties to reach out to Mark Anthony Zavala, please reach out to Buddy, from [facility] ([phone])  In this case obtaining consent for colonscopy can be obtained from guardian Sally. With diagnostic results, ethics will follow up to weigh in for next steps     Thank you for this challenging case. Please do not hesitate to call us if there are any questions.     Ethics Service will remain available for further conversation about the patient’s current condition and decision points that may occur.       					  DISCUSSED WITH MEDICAL ETHICS ATTENDINGS: -CHRISTINE MCCLAIN DNP (DIRECTOR OF MEDICAL ETHICS)   MORE THAN 50% OF THE TIME OF THIS CONSULTATION WAS SPENT IN COORDINATION OF CARE OF PATIENT

## 2023-02-06 NOTE — PROGRESS NOTE ADULT - PROBLEM SELECTOR PLAN 6
RESOLVED. Admission K 3.0. ECG with no T wave changes, no U waves. s/p KCl 40 mEq po & 30 mEq IV in ED  - trend BMP    #hypomagnesemia  RESOLVED. Admission Mg 1.7. s/p MgSO4 2g x1  - trend BMP    #hypophosphatemia  - repleted with KPhos RESOLVED. Admission K 3.0. ECG with no T wave changes, no U waves. s/p KCl 40 mEq po & 30 mEq IV in ED  - trend BMP    #hypomagnesemia  RESOLVED. Admission Mg 1.7. s/p MgSO4 2g x1  - trend BMP    #hypophosphatemia  - trend BMP

## 2023-02-07 LAB
ANION GAP SERPL CALC-SCNC: 10 MMOL/L — SIGNIFICANT CHANGE UP (ref 5–17)
BUN SERPL-MCNC: 18 MG/DL — SIGNIFICANT CHANGE UP (ref 7–23)
CALCIUM SERPL-MCNC: 8.5 MG/DL — SIGNIFICANT CHANGE UP (ref 8.4–10.5)
CHLORIDE SERPL-SCNC: 98 MMOL/L — SIGNIFICANT CHANGE UP (ref 96–108)
CO2 SERPL-SCNC: 25 MMOL/L — SIGNIFICANT CHANGE UP (ref 22–31)
CREAT SERPL-MCNC: 0.77 MG/DL — SIGNIFICANT CHANGE UP (ref 0.5–1.3)
EGFR: 92 ML/MIN/1.73M2 — SIGNIFICANT CHANGE UP
GLUCOSE SERPL-MCNC: 98 MG/DL — SIGNIFICANT CHANGE UP (ref 70–99)
HCT VFR BLD CALC: 37.9 % — LOW (ref 39–50)
HGB BLD-MCNC: 12.3 G/DL — LOW (ref 13–17)
MAGNESIUM SERPL-MCNC: 1.8 MG/DL — SIGNIFICANT CHANGE UP (ref 1.6–2.6)
MCHC RBC-ENTMCNC: 31.9 PG — SIGNIFICANT CHANGE UP (ref 27–34)
MCHC RBC-ENTMCNC: 32.5 GM/DL — SIGNIFICANT CHANGE UP (ref 32–36)
MCV RBC AUTO: 98.4 FL — SIGNIFICANT CHANGE UP (ref 80–100)
NRBC # BLD: 0 /100 WBCS — SIGNIFICANT CHANGE UP (ref 0–0)
PHOSPHATE SERPL-MCNC: 3.1 MG/DL — SIGNIFICANT CHANGE UP (ref 2.5–4.5)
PLATELET # BLD AUTO: 150 K/UL — SIGNIFICANT CHANGE UP (ref 150–400)
POTASSIUM SERPL-MCNC: 3.8 MMOL/L — SIGNIFICANT CHANGE UP (ref 3.5–5.3)
POTASSIUM SERPL-SCNC: 3.8 MMOL/L — SIGNIFICANT CHANGE UP (ref 3.5–5.3)
RBC # BLD: 3.85 M/UL — LOW (ref 4.2–5.8)
RBC # FLD: 12.6 % — SIGNIFICANT CHANGE UP (ref 10.3–14.5)
SODIUM SERPL-SCNC: 133 MMOL/L — LOW (ref 135–145)
WBC # BLD: 5.63 K/UL — SIGNIFICANT CHANGE UP (ref 3.8–10.5)
WBC # FLD AUTO: 5.63 K/UL — SIGNIFICANT CHANGE UP (ref 3.8–10.5)

## 2023-02-07 PROCEDURE — 99233 SBSQ HOSP IP/OBS HIGH 50: CPT | Mod: GC

## 2023-02-07 RX ORDER — POTASSIUM CHLORIDE 20 MEQ
20 PACKET (EA) ORAL ONCE
Refills: 0 | Status: COMPLETED | OUTPATIENT
Start: 2023-02-07 | End: 2023-02-07

## 2023-02-07 RX ORDER — MAGNESIUM SULFATE 500 MG/ML
2 VIAL (ML) INJECTION ONCE
Refills: 0 | Status: COMPLETED | OUTPATIENT
Start: 2023-02-07 | End: 2023-02-07

## 2023-02-07 RX ADMIN — TAMSULOSIN HYDROCHLORIDE 0.4 MILLIGRAM(S): 0.4 CAPSULE ORAL at 22:49

## 2023-02-07 RX ADMIN — Medication 20 MILLIEQUIVALENT(S): at 11:38

## 2023-02-07 RX ADMIN — ENOXAPARIN SODIUM 40 MILLIGRAM(S): 100 INJECTION SUBCUTANEOUS at 11:38

## 2023-02-07 RX ADMIN — FLUPHENAZINE HYDROCHLORIDE 2.5 MILLIGRAM(S): 1 TABLET, FILM COATED ORAL at 11:36

## 2023-02-07 RX ADMIN — LISINOPRIL 10 MILLIGRAM(S): 2.5 TABLET ORAL at 07:07

## 2023-02-07 RX ADMIN — FLUPHENAZINE HYDROCHLORIDE 2.5 MILLIGRAM(S): 1 TABLET, FILM COATED ORAL at 22:49

## 2023-02-07 RX ADMIN — Medication 1 SPRAY(S): at 22:49

## 2023-02-07 RX ADMIN — Medication 50 MILLIGRAM(S): at 22:49

## 2023-02-07 RX ADMIN — FINASTERIDE 5 MILLIGRAM(S): 5 TABLET, FILM COATED ORAL at 11:37

## 2023-02-07 RX ADMIN — CHLORHEXIDINE GLUCONATE 15 MILLILITER(S): 213 SOLUTION TOPICAL at 07:07

## 2023-02-07 RX ADMIN — Medication 1 SPRAY(S): at 11:39

## 2023-02-07 RX ADMIN — Medication 1 TABLET(S): at 11:36

## 2023-02-07 RX ADMIN — Medication 81 MILLIGRAM(S): at 11:37

## 2023-02-07 RX ADMIN — MONTELUKAST 10 MILLIGRAM(S): 4 TABLET, CHEWABLE ORAL at 11:45

## 2023-02-07 RX ADMIN — Medication 25 GRAM(S): at 12:01

## 2023-02-07 RX ADMIN — Medication 1 DROP(S): at 22:49

## 2023-02-07 RX ADMIN — Medication 1 DROP(S): at 11:35

## 2023-02-07 NOTE — PROGRESS NOTE ADULT - PROBLEM SELECTOR PLAN 6
RESOLVED. Admission K 3.0. ECG with no T wave changes, no U waves. s/p KCl 40 mEq po & 30 mEq IV in ED  - trend BMP    #hypomagnesemia  RESOLVED. Admission Mg 1.7. s/p MgSO4 2g x1  - trend BMP    #hypophosphatemia  - trend BMP

## 2023-02-07 NOTE — PROGRESS NOTE ADULT - SUBJECTIVE AND OBJECTIVE BOX
KASANDRA ISLAS  77y, Male    Patient is a 77y old  Male who presents with a chief complaint of diarrhea (06 Feb 2023 06:21)      INTERVAL HPI/OVERNIGHT EVENTS:    ROS: otherwise negative      T(C): 36.6 (02-07-23 @ 06:16), Max: 36.9 (02-06-23 @ 12:24)  HR: 60 (02-07-23 @ 06:16) (60 - 70)  BP: 127/72 (02-07-23 @ 06:16) (127/72 - 174/75)  RR: 18 (02-07-23 @ 06:16) (18 - 19)  SpO2: 96% (02-07-23 @ 06:16) (95% - 97%)  Wt(kg): --Vital Signs Last 24 Hrs  T(C): 36.6 (07 Feb 2023 06:16), Max: 36.9 (06 Feb 2023 12:24)  T(F): 97.8 (07 Feb 2023 06:16), Max: 98.4 (06 Feb 2023 12:24)  HR: 60 (07 Feb 2023 06:16) (60 - 70)  BP: 127/72 (07 Feb 2023 06:16) (127/72 - 174/75)  BP(mean): 100 (06 Feb 2023 21:05) (100 - 108)  RR: 18 (07 Feb 2023 06:16) (18 - 19)  SpO2: 96% (07 Feb 2023 06:16) (95% - 97%)    Parameters below as of 07 Feb 2023 06:16  Patient On (Oxygen Delivery Method): room air        PHYSICAL EXAM:  Constitutional: resting comfortably in bed; NAD  Head: NC/AT  Eyes: PERRL, EOMI, anicteric sclera  ENT: no nasal discharge; MMM  Neck: supple; no JVD or thyromegaly  Respiratory: CTA B/L; no W/R/R, no retractions  Cardiac: +S1/S2; RRR; no M/R/G; PMI non-displaced  Gastrointestinal: soft, NT/ND; no rebound or guarding; +BSx4  Back: spine midline, no bony tenderness or step-offs; no CVAT B/L  Extremities: WWP, no clubbing or cyanosis; no peripheral edema. Capillary refill <2 sec  Musculoskeletal: NROM x4; no joint swelling, tenderness or erythema  Vascular: 2+ radial, DP/PT pulses B/L  Dermatologic: skin warm, dry and intact; no rashes, wounds, or scars  Lymphatic: no submandibular or cervical LAD  Neurologic: AAOx3; CNII-XII grossly intact; no focal deficits  Psychiatric: affect and characteristics of appearance, verbalizations, behaviors are appropriate    LABS:                        12.3   5.63  )-----------( 150      ( 07 Feb 2023 08:28 )             37.9     02-07    133<L>  |  98  |  18  ----------------------------<  98  3.8   |  25  |  0.77    Ca    8.5      07 Feb 2023 08:28  Phos  3.1     02-07  Mg     1.8     02-07            CAPILLARY BLOOD GLUCOSE                MEDICATIONS  (STANDING):  artificial  tears Solution 1 Drop(s) Both EYES every 12 hours  aspirin  chewable 81 milliGRAM(s) Oral daily  chlorhexidine 0.12% Liquid 15 milliLiter(s) Oral Mucosa two times a day  enoxaparin Injectable 40 milliGRAM(s) SubCutaneous every 24 hours  finasteride 5 milliGRAM(s) Oral every 24 hours  fluPHENAZine 2.5 milliGRAM(s) Oral every 12 hours  fluticasone propionate 50 MICROgram(s)/spray Nasal Spray 1 Spray(s) Both Nostrils every 12 hours  lisinopril 10 milliGRAM(s) Oral daily  montelukast 10 milliGRAM(s) Oral every 24 hours  multivitamin/minerals 1 Tablet(s) Oral every 24 hours  tamsulosin 0.4 milliGRAM(s) Oral at bedtime  traZODone 50 milliGRAM(s) Oral at bedtime    MEDICATIONS  (PRN):  acetaminophen     Tablet .. 650 milliGRAM(s) Oral every 6 hours PRN Temp greater or equal to 38C (100.4F), Mild Pain (1 - 3)  albuterol    90 MICROgram(s) HFA Inhaler 2 Puff(s) Inhalation every 6 hours PRN Shortness of Breath and/or Wheezing      RADIOLOGY & ADDITIONAL TESTS: Reviewed   KASANDRA ISLAS  77y, Male    Patient is a 77y old  Male who presents with a chief complaint of diarrhea (06 Feb 2023 06:21)    Hospital course: Patient is a 77 year-old man with PMHx of developmental delay (nonverbal to minimally conversive at baseline), benign prostatic hyperplasia, glaucoma, hypokalemia, normal anion gap metabolic acidosis, hypomagnesemia, hypophosphatemia, who was brought by EMS for non-bloody diarrhea, vomiting, and respiratory distress and was admitted for further management. Patient lives at [facility], contact is [facility] [phone]. On admission, patient initially required 15L NRB and 4L NC before being weaned to room air. Positive for norovirus on GI PCR, supportive management. On 2/3/23, a CT scan was performed, who showed suspicion for cecal carcinoma. GI consulted, recommended colonoscopy and likely local resection; however patient without medical decision-making capacity. Ethics consulted, established that legal guardian is Mark Anthony Zavala [phone]. Unable to reach guardian, but per Buddy, guardian does not want patient to have invasive procedures such as colonoscopy. GOC discussion completed over phone with guardian on 2/7 (see chart note). Pt not to have invasive measures per guardian including colonoscopy. Ethics and Legal involved, f/u recs prior to discharge.    INTERVAL HPI/OVERNIGHT EVENTS: YA overnight. Patient seen and examined at bedside. No complaints.     ROS: otherwise negative      T(C): 36.6 (02-07-23 @ 06:16), Max: 36.9 (02-06-23 @ 12:24)  HR: 60 (02-07-23 @ 06:16) (60 - 70)  BP: 127/72 (02-07-23 @ 06:16) (127/72 - 174/75)  RR: 18 (02-07-23 @ 06:16) (18 - 19)  SpO2: 96% (02-07-23 @ 06:16) (95% - 97%)  Wt(kg): --Vital Signs Last 24 Hrs  T(C): 36.6 (07 Feb 2023 06:16), Max: 36.9 (06 Feb 2023 12:24)  T(F): 97.8 (07 Feb 2023 06:16), Max: 98.4 (06 Feb 2023 12:24)  HR: 60 (07 Feb 2023 06:16) (60 - 70)  BP: 127/72 (07 Feb 2023 06:16) (127/72 - 174/75)  BP(mean): 100 (06 Feb 2023 21:05) (100 - 108)  RR: 18 (07 Feb 2023 06:16) (18 - 19)  SpO2: 96% (07 Feb 2023 06:16) (95% - 97%)    Parameters below as of 07 Feb 2023 06:16  Patient On (Oxygen Delivery Method): room air        PHYSICAL EXAM:  Constitutional: resting comfortably in bed; NAD  Head: NC/AT  Eyes: PERRL, EOMI, anicteric sclera  ENT: no nasal discharge; MMM  Neck: supple; no JVD or thyromegaly  Respiratory: CTA B/L; no W/R/R, no retractions  Cardiac: +S1/S2; RRR; no M/R/G  Gastrointestinal: soft, NT/ND; no rebound or guarding; +BSx4  Extremities: WWP, no clubbing or cyanosis; no peripheral edema.   Musculoskeletal: NROM x4; no joint swelling, tenderness or erythema  Vascular: 2+ radial, DP/PT pulses B/L  Neurologic: AAOx1; CNII-XII grossly intact; no focal deficits    LABS:                        12.3   5.63  )-----------( 150      ( 07 Feb 2023 08:28 )             37.9     02-07    133<L>  |  98  |  18  ----------------------------<  98  3.8   |  25  |  0.77    Ca    8.5      07 Feb 2023 08:28  Phos  3.1     02-07  Mg     1.8     02-07            CAPILLARY BLOOD GLUCOSE                MEDICATIONS  (STANDING):  artificial  tears Solution 1 Drop(s) Both EYES every 12 hours  aspirin  chewable 81 milliGRAM(s) Oral daily  chlorhexidine 0.12% Liquid 15 milliLiter(s) Oral Mucosa two times a day  enoxaparin Injectable 40 milliGRAM(s) SubCutaneous every 24 hours  finasteride 5 milliGRAM(s) Oral every 24 hours  fluPHENAZine 2.5 milliGRAM(s) Oral every 12 hours  fluticasone propionate 50 MICROgram(s)/spray Nasal Spray 1 Spray(s) Both Nostrils every 12 hours  lisinopril 10 milliGRAM(s) Oral daily  montelukast 10 milliGRAM(s) Oral every 24 hours  multivitamin/minerals 1 Tablet(s) Oral every 24 hours  tamsulosin 0.4 milliGRAM(s) Oral at bedtime  traZODone 50 milliGRAM(s) Oral at bedtime    MEDICATIONS  (PRN):  acetaminophen     Tablet .. 650 milliGRAM(s) Oral every 6 hours PRN Temp greater or equal to 38C (100.4F), Mild Pain (1 - 3)  albuterol    90 MICROgram(s) HFA Inhaler 2 Puff(s) Inhalation every 6 hours PRN Shortness of Breath and/or Wheezing      RADIOLOGY & ADDITIONAL TESTS: Reviewed

## 2023-02-07 NOTE — PROGRESS NOTE ADULT - PROBLEM SELECTOR PLAN 5
RESOLVED. Admission [facility] 20 with normal anion gap consistent with history of diarrhea. s/p 2L NS in ED. Dry mucous membranes on exam.  - gentle maintenance fluids  - encourage oral hydration  - trend BMP RESOLVED. Admission [facility]3 20 with normal anion gap consistent with history of diarrhea. s/p 2L NS in ED. Dry mucous membranes on exam.  - encourage oral hydration  - trend BMP

## 2023-02-07 NOTE — PROGRESS NOTE ADULT - PROBLEM SELECTOR PLAN 2
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 Escort notes slight weight loss in the patient in the past few months. Unknown colonoscopy history.  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 Escort notes slight weight loss in the patient in the past few months. Unknown colonoscopy history.  - GI consulted, f/u recs  - patient's legal guardian is Mark Anthony Zavala [phone]; [facility] conversation documented in chart  - f/u advice from legal and ethics

## 2023-02-07 NOTE — PROGRESS NOTE ADULT - PROBLEM SELECTOR PLAN 8
F: none  E: replenish K<4, Mg<2  N: Regular  VTE Prophylaxis: Lovenox 40 Q24H  GI: not needed  C: Full Code  D: TAMIR

## 2023-02-08 LAB
ANION GAP SERPL CALC-SCNC: 5 MMOL/L — SIGNIFICANT CHANGE UP (ref 5–17)
BUN SERPL-MCNC: 16 MG/DL — SIGNIFICANT CHANGE UP (ref 7–23)
CALCIUM SERPL-MCNC: 8.9 MG/DL — SIGNIFICANT CHANGE UP (ref 8.4–10.5)
CHLORIDE SERPL-SCNC: 99 MMOL/L — SIGNIFICANT CHANGE UP (ref 96–108)
CO2 SERPL-SCNC: 30 MMOL/L — SIGNIFICANT CHANGE UP (ref 22–31)
CREAT SERPL-MCNC: 0.93 MG/DL — SIGNIFICANT CHANGE UP (ref 0.5–1.3)
CULTURE RESULTS: SIGNIFICANT CHANGE UP
CULTURE RESULTS: SIGNIFICANT CHANGE UP
EGFR: 84 ML/MIN/1.73M2 — SIGNIFICANT CHANGE UP
GLUCOSE SERPL-MCNC: 96 MG/DL — SIGNIFICANT CHANGE UP (ref 70–99)
HCT VFR BLD CALC: 36.4 % — LOW (ref 39–50)
HGB BLD-MCNC: 12.2 G/DL — LOW (ref 13–17)
MAGNESIUM SERPL-MCNC: 1.9 MG/DL — SIGNIFICANT CHANGE UP (ref 1.6–2.6)
MCHC RBC-ENTMCNC: 32.7 PG — SIGNIFICANT CHANGE UP (ref 27–34)
MCHC RBC-ENTMCNC: 33.5 GM/DL — SIGNIFICANT CHANGE UP (ref 32–36)
MCV RBC AUTO: 97.6 FL — SIGNIFICANT CHANGE UP (ref 80–100)
NRBC # BLD: 0 /100 WBCS — SIGNIFICANT CHANGE UP (ref 0–0)
PHOSPHATE SERPL-MCNC: 3 MG/DL — SIGNIFICANT CHANGE UP (ref 2.5–4.5)
PLATELET # BLD AUTO: 154 K/UL — SIGNIFICANT CHANGE UP (ref 150–400)
POTASSIUM SERPL-MCNC: 4.5 MMOL/L — SIGNIFICANT CHANGE UP (ref 3.5–5.3)
POTASSIUM SERPL-SCNC: 4.5 MMOL/L — SIGNIFICANT CHANGE UP (ref 3.5–5.3)
RBC # BLD: 3.73 M/UL — LOW (ref 4.2–5.8)
RBC # FLD: 12.9 % — SIGNIFICANT CHANGE UP (ref 10.3–14.5)
SODIUM SERPL-SCNC: 134 MMOL/L — LOW (ref 135–145)
SPECIMEN SOURCE: SIGNIFICANT CHANGE UP
SPECIMEN SOURCE: SIGNIFICANT CHANGE UP
WBC # BLD: 5.03 K/UL — SIGNIFICANT CHANGE UP (ref 3.8–10.5)
WBC # FLD AUTO: 5.03 K/UL — SIGNIFICANT CHANGE UP (ref 3.8–10.5)

## 2023-02-08 PROCEDURE — 99233 SBSQ HOSP IP/OBS HIGH 50: CPT | Mod: GC

## 2023-02-08 PROCEDURE — 99233 SBSQ HOSP IP/OBS HIGH 50: CPT

## 2023-02-08 RX ADMIN — FLUPHENAZINE HYDROCHLORIDE 2.5 MILLIGRAM(S): 1 TABLET, FILM COATED ORAL at 10:22

## 2023-02-08 RX ADMIN — Medication 81 MILLIGRAM(S): at 11:56

## 2023-02-08 RX ADMIN — LISINOPRIL 10 MILLIGRAM(S): 2.5 TABLET ORAL at 06:52

## 2023-02-08 RX ADMIN — Medication 1 SPRAY(S): at 21:38

## 2023-02-08 RX ADMIN — ENOXAPARIN SODIUM 40 MILLIGRAM(S): 100 INJECTION SUBCUTANEOUS at 10:20

## 2023-02-08 RX ADMIN — FLUPHENAZINE HYDROCHLORIDE 2.5 MILLIGRAM(S): 1 TABLET, FILM COATED ORAL at 22:39

## 2023-02-08 RX ADMIN — Medication 1 TABLET(S): at 10:21

## 2023-02-08 RX ADMIN — FINASTERIDE 5 MILLIGRAM(S): 5 TABLET, FILM COATED ORAL at 10:21

## 2023-02-08 RX ADMIN — TAMSULOSIN HYDROCHLORIDE 0.4 MILLIGRAM(S): 0.4 CAPSULE ORAL at 22:39

## 2023-02-08 RX ADMIN — CHLORHEXIDINE GLUCONATE 15 MILLILITER(S): 213 SOLUTION TOPICAL at 06:52

## 2023-02-08 RX ADMIN — Medication 1 DROP(S): at 21:38

## 2023-02-08 RX ADMIN — MONTELUKAST 10 MILLIGRAM(S): 4 TABLET, CHEWABLE ORAL at 10:21

## 2023-02-08 RX ADMIN — Medication 50 MILLIGRAM(S): at 22:39

## 2023-02-08 NOTE — DIETITIAN INITIAL EVALUATION ADULT - PERTINENT MEDS FT
MEDICATIONS  (STANDING):  artificial  tears Solution 1 Drop(s) Both EYES every 12 hours  aspirin  chewable 81 milliGRAM(s) Oral daily  chlorhexidine 0.12% Liquid 15 milliLiter(s) Oral Mucosa two times a day  enoxaparin Injectable 40 milliGRAM(s) SubCutaneous every 24 hours  finasteride 5 milliGRAM(s) Oral every 24 hours  fluPHENAZine 2.5 milliGRAM(s) Oral every 12 hours  fluticasone propionate 50 MICROgram(s)/spray Nasal Spray 1 Spray(s) Both Nostrils every 12 hours  lisinopril 10 milliGRAM(s) Oral daily  montelukast 10 milliGRAM(s) Oral every 24 hours  multivitamin/minerals 1 Tablet(s) Oral every 24 hours  tamsulosin 0.4 milliGRAM(s) Oral at bedtime  traZODone 50 milliGRAM(s) Oral at bedtime    MEDICATIONS  (PRN):  acetaminophen     Tablet .. 650 milliGRAM(s) Oral every 6 hours PRN Temp greater or equal to 38C (100.4F), Mild Pain (1 - 3)  albuterol    90 MICROgram(s) HFA Inhaler 2 Puff(s) Inhalation every 6 hours PRN Shortness of Breath and/or Wheezing

## 2023-02-08 NOTE — PROGRESS NOTE ADULT - PROBLEM SELECTOR PLAN 5
RESOLVED. Admission HCO3 20 with normal anion gap consistent with history of diarrhea. s/p 2L NS in ED. Dry mucous membranes on exam.  - encourage oral hydration  - trend BMP

## 2023-02-08 NOTE — PROGRESS NOTE ADULT - SUBJECTIVE AND OBJECTIVE BOX
INTERVAL HPI/OVERNIGHT EVENTS:  Patient was seen and examined at bedside. As per overnight team, no o/n events, patient resting comfortably. Pt remains AAOx0. States he is feeling well this AM. Unable to obtain remainder of ROS    VITAL SIGNS:  T(F): 98.2 (02-08-23 @ 06:20)  HR: 68 (02-08-23 @ 06:20)  BP: 127/73 (02-08-23 @ 06:20)  RR: 19 (02-08-23 @ 06:20)  SpO2: 96% (02-08-23 @ 06:20)  Wt(kg): --        PHYSICAL EXAM:  Constitutional: resting comfortably in bed; NAD  Head: NC/AT  Eyes: PERRL, EOMI, anicteric sclera  ENT: no nasal discharge; MMM  Neck: supple; no JVD or thyromegaly  Respiratory: CTA B/L; no W/R/R, no retractions  Cardiac: +S1/S2; RRR; no M/R/G  Gastrointestinal: soft, NT/ND; no rebound or guarding; +BSx4  Extremities: WWP, no clubbing or cyanosis; no peripheral edema.   Musculoskeletal: NROM x4; no joint swelling, tenderness or erythema  Vascular: 2+ radial, DP/PT pulses B/L  Neurologic: AAOx1; CNII-XII grossly intact; no focal deficits    MEDICATIONS  (STANDING):  artificial  tears Solution 1 Drop(s) Both EYES every 12 hours  aspirin  chewable 81 milliGRAM(s) Oral daily  chlorhexidine 0.12% Liquid 15 milliLiter(s) Oral Mucosa two times a day  enoxaparin Injectable 40 milliGRAM(s) SubCutaneous every 24 hours  finasteride 5 milliGRAM(s) Oral every 24 hours  fluPHENAZine 2.5 milliGRAM(s) Oral every 12 hours  fluticasone propionate 50 MICROgram(s)/spray Nasal Spray 1 Spray(s) Both Nostrils every 12 hours  lisinopril 10 milliGRAM(s) Oral daily  montelukast 10 milliGRAM(s) Oral every 24 hours  multivitamin/minerals 1 Tablet(s) Oral every 24 hours  tamsulosin 0.4 milliGRAM(s) Oral at bedtime  traZODone 50 milliGRAM(s) Oral at bedtime    MEDICATIONS  (PRN):  acetaminophen     Tablet .. 650 milliGRAM(s) Oral every 6 hours PRN Temp greater or equal to 38C (100.4F), Mild Pain (1 - 3)  albuterol    90 MICROgram(s) HFA Inhaler 2 Puff(s) Inhalation every 6 hours PRN Shortness of Breath and/or Wheezing      Allergies    No Known Allergies    Intolerances        LABS:                        12.2   5.03  )-----------( 154      ( 08 Feb 2023 05:30 )             36.4     02-08    134<L>  |  99  |  16  ----------------------------<  96  4.5   |  30  |  0.93    Ca    8.9      08 Feb 2023 05:30  Phos  3.0     02-08  Mg     1.9     02-08            RADIOLOGY & ADDITIONAL TESTS:  Reviewed

## 2023-02-08 NOTE — PROGRESS NOTE ADULT - ATTENDING COMMENTS
Eating lunch and watching a movie.   No complaints,.   We discussed findings and plan, but unclear comprehension.   Plan for colonoscopy monday with consent from HCP.
Patient seen and d/w Dr. Husain.  Patient laying in bed, with no new complaints. No acute event overnight. Continue GOC and appreciate evaluation by ethics. GI will continue to follow if change in GOC to pursue further evaluation of cecal lesion seen on imaging.
#viral gastroenteritis 2/2 Norovirus   #abnormal electrolytes  GI pcr positive, symptoms resolving, tolerating diet  electrolytes replaced as above   CT noted to have incidental finding of 4.5 cm long annular lesion involving the superior cecum   will need colonoscopy to further evaluate the lesion, inpt vs outpt: will need hcp / ethics  will determine HCP as pt does not have capacity for making medical decisions, sw consulted   GI following
#viral gastroenteritis 2/2 Norovirus   #abnormal electrolytes  GI pcr positive, symptoms resolving, tolerating diet  electrolytes replaced as above   CT noted to have incidental finding of 4.5 cm long annular lesion involving the superior cecum   will need colonoscopy to further evaluate the lesion, inpt    ethic consulted and HCP located however unable to be reached at this time, will continue to try as pt does not have capacity for making medical decisions, sw consulted   GI following
#viral gastroenteritis 2/2 Norovirus   #abnormal electrolytes  GI pcr positive, symptoms resolving, tolerating diet  electrolytes replaced as needed   CT noted to have incidental finding of 4.5 cm long annular lesion involving the superior cecum   will need colonoscopy to further evaluate the lesion    ethic and legal consulted when HCP was not reached. HCP refusing colonoscopy '' states its a scam''. Per ethics and legal HCP might not be the legal appointed court guardian and they are waiting for OPWDD's response- may be able to find a surrogate if Mark Anthony Zavala is not the guardian.   GI following and wants the further evaluation of mass inpt.   outpt records of colonoscopy and CT scan reviewed
#viral gastroenteritis 2/2 Norovirus   #abnormal electrolytes  GI pcr positive, symptoms resolving, tolerating diet  electrolytes replaced as needed   CT noted to have incidental finding of 4.5 cm long annular lesion involving the superior cecum   will need colonoscopy to further evaluate the lesion    ethic consulted and HCP located however unable to be reached by us. legal involved now  GI following
#viral gastroenteritis 2/2 Norovirus   #abnormal electrolytes  GI pcr positive, symptoms resolving, tolerating diet  electrolytes replaced as above   CT noted to have incidental finding of 4.5 cm long annular lesion involving the superior cecum   will need colonoscopy to further evaluate the lesion, likely outpt  will determine HCP as pt does not have capacity for making medical decisions, sw consulted   GI following   dispo G home in am

## 2023-02-08 NOTE — PROGRESS NOTE ADULT - PROBLEM SELECTOR PLAN 2
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 Escort notes slight weight loss in the patient in the past few months. Unknown colonoscopy history.  - GI consulted, f/u recs  - patient's legal guardian is Mark Anthony Zavala [phone]; [facility] conversation documented in chart  - f/u advice from legal and ethics  - per ethics: will need additional 24 hrs for conversation with HCP and further recommendations

## 2023-02-08 NOTE — DIETITIAN INITIAL EVALUATION ADULT - OTHER INFO
77M hx asthma, developmental delay (nonverbal at baseline), BPH, glaucoma, BIBEMS for nonbloody diarrhea, vomiting, and respiratory distress for 1 day, admitted for further management, found to have cecal mass on CT imaging, concerning for malignancy. Pending ethics conversation with HCP regarding inpatient colonoscopy and resection of mass.    Pt assessed at bedside. Resting in bed. Pt started on regular diet. Tolerated well, good PO intake noted. No intake issues or weight issues prior to onset of symptoms. GI following. Will continue to follow diet tolerance, adjust as needed. No pain noted. Eliazar score 17. RD to follow, nutrition recommendations below.

## 2023-02-08 NOTE — DIETITIAN INITIAL EVALUATION ADULT - ADD RECOMMEND
1. Continue regular diet  2. Follow intake closely, adjust prn  3. Monitor electrolytes, adjust and replete prn  4. Pain and bowel regimen per team   5. RD diet edu prn

## 2023-02-08 NOTE — DIETITIAN INITIAL EVALUATION ADULT - PERTINENT LABORATORY DATA
02-08    134<L>  |  99  |  16  ----------------------------<  96  4.5   |  30  |  0.93    Ca    8.9      08 Feb 2023 05:30  Phos  3.0     02-08  Mg     1.9     02-08

## 2023-02-08 NOTE — PROGRESS NOTE ADULT - PROBLEM SELECTOR PLAN 1
Patient with 1 day of nonbloody vomiting and diarrhea, history of similar symptoms with group home residents. Patient not meeting sepsis criteria. CT A/P showed ileus with findings c/w history of diarrhea. CT with significant stool burden.    - GI PCR positive for norovirus  - blood cultures NGTD  - no further episodes of diarrhea overnight

## 2023-02-08 NOTE — PROGRESS NOTE ADULT - SUBJECTIVE AND OBJECTIVE BOX
Pt seen and examined at bedside.  Ongoing discussions with ethics and pt's HCP regarding GOC  Comfortably appearing in bed.   NAEO.  Unable to obtain ROS as pt non-conversational at baseline    Allergies    No Known Allergies    Intolerances        MEDICATIONS:  MEDICATIONS  (STANDING):  artificial  tears Solution 1 Drop(s) Both EYES every 12 hours  aspirin  chewable 81 milliGRAM(s) Oral daily  chlorhexidine 0.12% Liquid 15 milliLiter(s) Oral Mucosa two times a day  enoxaparin Injectable 40 milliGRAM(s) SubCutaneous every 24 hours  finasteride 5 milliGRAM(s) Oral every 24 hours  fluPHENAZine 2.5 milliGRAM(s) Oral every 12 hours  fluticasone propionate 50 MICROgram(s)/spray Nasal Spray 1 Spray(s) Both Nostrils every 12 hours  lisinopril 10 milliGRAM(s) Oral daily  montelukast 10 milliGRAM(s) Oral every 24 hours  multivitamin/minerals 1 Tablet(s) Oral every 24 hours  tamsulosin 0.4 milliGRAM(s) Oral at bedtime  traZODone 50 milliGRAM(s) Oral at bedtime    MEDICATIONS  (PRN):  acetaminophen     Tablet .. 650 milliGRAM(s) Oral every 6 hours PRN Temp greater or equal to 38C (100.4F), Mild Pain (1 - 3)  albuterol    90 MICROgram(s) HFA Inhaler 2 Puff(s) Inhalation every 6 hours PRN Shortness of Breath and/or Wheezing      Vital Signs Last 24 Hrs  T(C): 36.8 (08 Feb 2023 06:20), Max: 37.1 (07 Feb 2023 12:55)  T(F): 98.2 (08 Feb 2023 06:20), Max: 98.7 (07 Feb 2023 12:55)  HR: 68 (08 Feb 2023 06:20) (68 - 80)  BP: 127/73 (08 Feb 2023 06:20) (127/73 - 160/82)  BP(mean): --  RR: 19 (08 Feb 2023 06:20) (18 - 19)  SpO2: 96% (08 Feb 2023 06:20) (94% - 99%)    Parameters below as of 08 Feb 2023 06:20  Patient On (Oxygen Delivery Method): room air          PHYSICAL EXAM:    General: No acute distress  HEENT: MMM, conjunctiva and sclera clear  Gastrointestinal: Soft non-distended. No rebound or guarding  Skin: Warm and dry. No obvious rash    LABS:  CBC Full  -  ( 08 Feb 2023 05:30 )  WBC Count : 5.03 K/uL  RBC Count : 3.73 M/uL  Hemoglobin : 12.2 g/dL  Hematocrit : 36.4 %  Platelet Count - Automated : 154 K/uL  Mean Cell Volume : 97.6 fl  Mean Cell Hemoglobin : 32.7 pg  Mean Cell Hemoglobin Concentration : 33.5 gm/dL  Auto Neutrophil # : x  Auto Lymphocyte # : x  Auto Monocyte # : x  Auto Eosinophil # : x  Auto Basophil # : x  Auto Neutrophil % : x  Auto Lymphocyte % : x  Auto Monocyte % : x  Auto Eosinophil % : x  Auto Basophil % : x    02-08    134<L>  |  99  |  16  ----------------------------<  96  4.5   |  30  |  0.93    Ca    8.9      08 Feb 2023 05:30  Phos  3.0     02-08  Mg     1.9     02-08

## 2023-02-09 ENCOUNTER — TRANSCRIPTION ENCOUNTER (OUTPATIENT)
Age: 78
End: 2023-02-09

## 2023-02-09 VITALS
OXYGEN SATURATION: 96 % | SYSTOLIC BLOOD PRESSURE: 120 MMHG | DIASTOLIC BLOOD PRESSURE: 61 MMHG | HEART RATE: 71 BPM | RESPIRATION RATE: 19 BRPM | TEMPERATURE: 98 F

## 2023-02-09 LAB
ALBUMIN SERPL ELPH-MCNC: 3.1 G/DL — LOW (ref 3.3–5)
ALP SERPL-CCNC: 68 U/L — SIGNIFICANT CHANGE UP (ref 40–120)
ALT FLD-CCNC: 37 U/L — SIGNIFICANT CHANGE UP (ref 10–45)
ANION GAP SERPL CALC-SCNC: 7 MMOL/L — SIGNIFICANT CHANGE UP (ref 5–17)
AST SERPL-CCNC: 32 U/L — SIGNIFICANT CHANGE UP (ref 10–40)
BILIRUB SERPL-MCNC: 0.4 MG/DL — SIGNIFICANT CHANGE UP (ref 0.2–1.2)
BUN SERPL-MCNC: 22 MG/DL — SIGNIFICANT CHANGE UP (ref 7–23)
CALCIUM SERPL-MCNC: 8.5 MG/DL — SIGNIFICANT CHANGE UP (ref 8.4–10.5)
CHLORIDE SERPL-SCNC: 101 MMOL/L — SIGNIFICANT CHANGE UP (ref 96–108)
CO2 SERPL-SCNC: 26 MMOL/L — SIGNIFICANT CHANGE UP (ref 22–31)
CREAT SERPL-MCNC: 0.91 MG/DL — SIGNIFICANT CHANGE UP (ref 0.5–1.3)
EGFR: 86 ML/MIN/1.73M2 — SIGNIFICANT CHANGE UP
GLUCOSE SERPL-MCNC: 101 MG/DL — HIGH (ref 70–99)
HCT VFR BLD CALC: 36.3 % — LOW (ref 39–50)
HGB BLD-MCNC: 12 G/DL — LOW (ref 13–17)
MAGNESIUM SERPL-MCNC: 1.9 MG/DL — SIGNIFICANT CHANGE UP (ref 1.6–2.6)
MCHC RBC-ENTMCNC: 32.8 PG — SIGNIFICANT CHANGE UP (ref 27–34)
MCHC RBC-ENTMCNC: 33.1 GM/DL — SIGNIFICANT CHANGE UP (ref 32–36)
MCV RBC AUTO: 99.2 FL — SIGNIFICANT CHANGE UP (ref 80–100)
NRBC # BLD: 0 /100 WBCS — SIGNIFICANT CHANGE UP (ref 0–0)
PHOSPHATE SERPL-MCNC: 2.8 MG/DL — SIGNIFICANT CHANGE UP (ref 2.5–4.5)
PLATELET # BLD AUTO: 171 K/UL — SIGNIFICANT CHANGE UP (ref 150–400)
POTASSIUM SERPL-MCNC: 4.3 MMOL/L — SIGNIFICANT CHANGE UP (ref 3.5–5.3)
POTASSIUM SERPL-SCNC: 4.3 MMOL/L — SIGNIFICANT CHANGE UP (ref 3.5–5.3)
PROT SERPL-MCNC: 5.6 G/DL — LOW (ref 6–8.3)
RBC # BLD: 3.66 M/UL — LOW (ref 4.2–5.8)
RBC # FLD: 12.7 % — SIGNIFICANT CHANGE UP (ref 10.3–14.5)
SODIUM SERPL-SCNC: 134 MMOL/L — LOW (ref 135–145)
WBC # BLD: 5.35 K/UL — SIGNIFICANT CHANGE UP (ref 3.8–10.5)
WBC # FLD AUTO: 5.35 K/UL — SIGNIFICANT CHANGE UP (ref 3.8–10.5)

## 2023-02-09 PROCEDURE — 84100 ASSAY OF PHOSPHORUS: CPT

## 2023-02-09 PROCEDURE — 87507 IADNA-DNA/RNA PROBE TQ 12-25: CPT

## 2023-02-09 PROCEDURE — 71260 CT THORAX DX C+: CPT

## 2023-02-09 PROCEDURE — 96375 TX/PRO/DX INJ NEW DRUG ADDON: CPT

## 2023-02-09 PROCEDURE — 82330 ASSAY OF CALCIUM: CPT

## 2023-02-09 PROCEDURE — 87324 CLOSTRIDIUM AG IA: CPT

## 2023-02-09 PROCEDURE — 86850 RBC ANTIBODY SCREEN: CPT

## 2023-02-09 PROCEDURE — 99285 EMERGENCY DEPT VISIT HI MDM: CPT | Mod: 25

## 2023-02-09 PROCEDURE — 83735 ASSAY OF MAGNESIUM: CPT

## 2023-02-09 PROCEDURE — 87040 BLOOD CULTURE FOR BACTERIA: CPT

## 2023-02-09 PROCEDURE — 97116 GAIT TRAINING THERAPY: CPT

## 2023-02-09 PROCEDURE — 87449 NOS EACH ORGANISM AG IA: CPT

## 2023-02-09 PROCEDURE — 94640 AIRWAY INHALATION TREATMENT: CPT

## 2023-02-09 PROCEDURE — 97161 PT EVAL LOW COMPLEX 20 MIN: CPT

## 2023-02-09 PROCEDURE — 85027 COMPLETE CBC AUTOMATED: CPT

## 2023-02-09 PROCEDURE — 84295 ASSAY OF SERUM SODIUM: CPT

## 2023-02-09 PROCEDURE — 80048 BASIC METABOLIC PNL TOTAL CA: CPT

## 2023-02-09 PROCEDURE — 85610 PROTHROMBIN TIME: CPT

## 2023-02-09 PROCEDURE — 83605 ASSAY OF LACTIC ACID: CPT

## 2023-02-09 PROCEDURE — 84484 ASSAY OF TROPONIN QUANT: CPT

## 2023-02-09 PROCEDURE — 85025 COMPLETE CBC W/AUTO DIFF WBC: CPT

## 2023-02-09 PROCEDURE — 85730 THROMBOPLASTIN TIME PARTIAL: CPT

## 2023-02-09 PROCEDURE — 80053 COMPREHEN METABOLIC PANEL: CPT

## 2023-02-09 PROCEDURE — 36415 COLL VENOUS BLD VENIPUNCTURE: CPT

## 2023-02-09 PROCEDURE — 86901 BLOOD TYPING SEROLOGIC RH(D): CPT

## 2023-02-09 PROCEDURE — 83690 ASSAY OF LIPASE: CPT

## 2023-02-09 PROCEDURE — 0225U NFCT DS DNA&RNA 21 SARSCOV2: CPT

## 2023-02-09 PROCEDURE — 99239 HOSP IP/OBS DSCHRG MGMT >30: CPT

## 2023-02-09 PROCEDURE — 96374 THER/PROPH/DIAG INJ IV PUSH: CPT

## 2023-02-09 PROCEDURE — 87637 SARSCOV2&INF A&B&RSV AMP PRB: CPT

## 2023-02-09 PROCEDURE — 86900 BLOOD TYPING SEROLOGIC ABO: CPT

## 2023-02-09 PROCEDURE — 82803 BLOOD GASES ANY COMBINATION: CPT

## 2023-02-09 PROCEDURE — 82378 CARCINOEMBRYONIC ANTIGEN: CPT

## 2023-02-09 PROCEDURE — 97112 NEUROMUSCULAR REEDUCATION: CPT

## 2023-02-09 PROCEDURE — 84132 ASSAY OF SERUM POTASSIUM: CPT

## 2023-02-09 PROCEDURE — 71045 X-RAY EXAM CHEST 1 VIEW: CPT

## 2023-02-09 PROCEDURE — 74177 CT ABD & PELVIS W/CONTRAST: CPT | Mod: MA

## 2023-02-09 PROCEDURE — 83880 ASSAY OF NATRIURETIC PEPTIDE: CPT

## 2023-02-09 RX ORDER — LISINOPRIL 2.5 MG/1
1 TABLET ORAL
Qty: 30 | Refills: 0
Start: 2023-02-09 | End: 2023-03-10

## 2023-02-09 RX ADMIN — Medication 1 TABLET(S): at 10:44

## 2023-02-09 RX ADMIN — LISINOPRIL 10 MILLIGRAM(S): 2.5 TABLET ORAL at 06:12

## 2023-02-09 RX ADMIN — CHLORHEXIDINE GLUCONATE 15 MILLILITER(S): 213 SOLUTION TOPICAL at 06:12

## 2023-02-09 RX ADMIN — ENOXAPARIN SODIUM 40 MILLIGRAM(S): 100 INJECTION SUBCUTANEOUS at 10:43

## 2023-02-09 RX ADMIN — Medication 1 SPRAY(S): at 10:45

## 2023-02-09 RX ADMIN — FLUPHENAZINE HYDROCHLORIDE 2.5 MILLIGRAM(S): 1 TABLET, FILM COATED ORAL at 10:44

## 2023-02-09 RX ADMIN — MONTELUKAST 10 MILLIGRAM(S): 4 TABLET, CHEWABLE ORAL at 10:43

## 2023-02-09 RX ADMIN — FINASTERIDE 5 MILLIGRAM(S): 5 TABLET, FILM COATED ORAL at 10:44

## 2023-02-09 NOTE — PROGRESS NOTE ADULT - PROBLEM SELECTOR PROBLEM 5
Normal anion gap metabolic acidosis

## 2023-02-09 NOTE — CONSULT NOTE ADULT - CONSULT REASON
Cecal mass
CONSULT PURPOSE:  Assist the team in ethical dilemma posed by a 77-year-old man with developmental delay who presented with diarrhea and vomiting, with suspicion for cecal carcinoma regarding surrogate decision maker.
To assist in the ethical dilemma of obtaining consent for diagnostic procedure

## 2023-02-09 NOTE — CONSULT NOTE ADULT - ASSESSMENT
RECOMMENDATION:   Ethics spoke with Mr. Mark Anthony Zavala – Mr. Scott’s legal guardian –, who gave consent to perform colonoscopy for diagnosis purposes either as inpatient or outpatient. Please send consent forms to [email or web address]  If urgent need to contact Mr. Zavala, reach out to Ethics ([phone]) who will reach out to Mr. Zavala ([phone]) since Mr. Zavala tends to only answer to phone numbers he knows.   Ethics Service will remain available for further conversation about the patient’s current condition and decision points that may occur

## 2023-02-09 NOTE — PROGRESS NOTE ADULT - PROBLEM SELECTOR PLAN 4
Patient with chronic developmental delay, nonverbal at baseline, lives in group home for patients with developmental delay. Home medications trifluoperazine 5 mg po qd, trazodone 50 mg po qhs. Follows simple commands.  - continue home trazodone  - fluphenazine 2.5 mg po bid (therapeutic interchange)

## 2023-02-09 NOTE — PROGRESS NOTE ADULT - ASSESSMENT
77M hx  developmental delay (nonverbal at baseline) admitted for acute gastroenteritis without overt GIB incidentally found to have 4.5 cm long annular lesion involving the superior cecum concerning for malignancy    GI consulted given cecal lesion    #Cecal lesion:  No apparent acute GIB. Hgb nml.  Lesion appearance concerning for malignancy, presumbably unrelated to admitting of Norovirus associated gastroenteritis, which pt appears to have recovered from    CEA 2.7  CT chest without suspicious lesions    Recommendations:  -Full diet as tolerated OK  -Ongoing discussions regarding GOC with HCP, primary team and ethics committee    From GI standpoint, colonoscopy is an otherwise low risk procedure and based on known PMHx and physical exam with recent labs, expect that pt will be able to tolerate endoscopic evaluation well.  Full colon prep would be required given location of mass in cecum which may pose technical difficulties given baseline cognitive status, though pt does appear to be able to feed himself/drink without assistance during bedside evaluation.  Cannot rule out necessity of NGT to administer prep if pt responsive to verbal motivation at bedside to drink prep.    With respect to aftercare should cecal mass be malignant, would defer to surgical/medical onc team recommendations as proceeding with diagnostic colonoscopy would then necessitate further conversations with HCP regarding GOC with respect to cancer care.  GI svc remains available should the decision be to pursue colonoscopy.     #Acute viral gastroenteritis:  Norovirus + on PCR  Hemodynamically stable and afebrile. HypoK noted on admissions labs though nml renal function/liver enzymes.    Cdiff negative    Recommendations:  -Resuscitation/lyte replacement  -Supportive care per primary team    Marcellus Husain DO  Gastroenterology Fellow  Pager: [phone]  
77M hx  developmental delay (nonverbal at baseline) admitted for acute gastroenteritis without overt GIB incidentally found to have 4.5 cm long annular lesion involving the superior cecum concerning for malignancy    GI consulted given cecal lesion    #Cecal lesion:  No apparent acute GIB. Hgb nml.  Lesion appearance concerning for malignancy, though this is likely unrelated to admitting dx given what appears to be viral gastroenteritis outbreak at residential facility per collateral hx obtained    CEA 2.7  CT chest without suspicious lesions    Recommendations:  -Full diet as tolerated OK  -Will plan to discuss concerns with pt's healthcare proxy to determine procedural consent in setting of recommendation for colonoscopy for definitive eval and dx  -Defer colonoscopy for now in favor of medical mgmt of acute gastroenteritis though inpt evaluation warranted once clinically improved.    #Acute viral gastroenteritis:  Norovirus + on PCR  Hemodynamically stable and afebrile. HypoK noted on admissions labs though nml renal function/liver enzymes.    Cdiff negative    Recommendations:  -Resuscitation/lyte replacement  -Supportive care per primary team    Marcellus Husain DO  Gastroenterology Fellow  Pager: [phone]  
77M hx asthma, developmental delay (nonverbal at baseline), BPH, glaucoma, BIBEMS for nonbloody diarrhea, vomiting, and respiratory distress for 1 day, admitted for further management, found to have cecal mass on CT imaging, concerning for malignancy. Pending ethics conversation with HCP regarding inpatient colonoscopy and resection of mass.
77M hx asthma, developmental delay (nonverbal at baseline), BPH, glaucoma, BIBEMS for nonbloody diarrhea, vomiting, and respiratory distress for 1 day, admitted for further management.
77M hx asthma, developmental delay (nonverbal at baseline), BPH, glaucoma, BIBEMS for nonbloody diarrhea, vomiting, and respiratory distress for 1 day, admitted for further management.
77M hx asthma, developmental delay (nonverbal at baseline), BPH, glaucoma, BIBEMS for nonbloody diarrhea, vomiting, and respiratory distress for 1 day, admitted for further management, found to have cecal mass on CT imaging, concerning for malignancy. Pending ethics conversation with HCP regarding inpatient colonoscopy and resection of mass.
77M hx asthma, developmental delay (nonverbal at baseline), BPH, glaucoma, BIBEMS for nonbloody diarrhea, vomiting, and respiratory distress for 1 day, admitted for further management.
77M hx asthma, developmental delay (nonverbal at baseline), BPH, glaucoma, BIBEMS for nonbloody diarrhea, vomiting, and respiratory distress for 1 day, admitted for further management.

## 2023-02-09 NOTE — PROGRESS NOTE ADULT - PROVIDER SPECIALTY LIST ADULT
Gastroenterology
Internal Medicine
Gastroenterology
Internal Medicine

## 2023-02-09 NOTE — PROGRESS NOTE ADULT - PROBLEM SELECTOR PLAN 2
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 Escort notes slight weight loss in the patient in the past few months. Unknown colonoscopy history.  - GI consulted, f/u recs  - patient's legal guardian is Makr Anthony Zavala [phone]; [facility] conversation documented in chart  - f/u advice from legal and ethics  - per ethics: will need additional 24 hrs for conversation with HCP and further recommendations

## 2023-02-09 NOTE — PROGRESS NOTE ADULT - PROBLEM SELECTOR PROBLEM 2
Abnormal finding on CT scan

## 2023-02-09 NOTE — CONSULT NOTE ADULT - SUBJECTIVE AND OBJECTIVE BOX
Consult requested by:	Joy Kerns	Role: MD Service: Medicine Attending: Kelly Denson  Consultant: Jania LYNN     Contact #s: c) [phone]  (o) [phone]  CONSULT PURPOSE:  Assist the team in ethical dilemma posed by a 77-year-old man with developmental delay who presented with diarrhea and vomiting, with suspicion for cecal carcinoma regarding surrogate decision maker.    CLINICAL SUMMARY: This is a 77 year-old man with past medical history of developmental delay (nonverbal at baseline), benign prostatic hyperplasia, glaucoma, Hypokalemia, Normal anion gap metabolic acidosis, hypomagnesemia, hypophosphatemia, who was brought by EMS for non-bloody diarrhea, vomiting, and respiratory distress and was admitted for further management. On admission, patient initially required 15L NRB and 4L NC before being weaned to room air. On 2/3/23, a CT scan was performed, who showed suspicion for cecal carcinoma. Escort also notes slight weight loss in the patient in the past few months.  Ethics consult initiated to assist in surrogate decision maker  Prognosis Estimate (survival in days, wks, mos, yrs): unknown  Patient Decision-Making Capacity:  0 Has capacity 	1  Lacks capacity at present   Patient Aware of:  Diagnosis:  0 Yes   0 No  1 Unknown    Prognosis:  0 Yes   0 No  1 Unknown       Name of medical decision-maker should patient lack capacity:	Relationship:   Role:  0 Health Care Proxy     1 Legal Surrogates  Mark Anthony Zavala Contact #(s): [phone]  OTHER STAKEHOLDERS:   Evidence of Patient’s Preference of Life-Sustaining Treatment (Written or Oral):   Resuscitation status:  DNR:  0Yes   1No      DNI:  0Yes   1No    Discussions:   Discussion with Joy Kerns on 2/6/23 (11-11:15AM & 11:15-11:30 AM): Case reviewed and discussed. Medical team looking for a decision maker for this patient. GI consulted, recommends colonoscopy for suspicion of cecal carcinoma.  Discussion with Martha Kirkland, , [facility] on 2/6/23 (1-1:15PM): [facility] is an [facility] licensed facility. Mr. Scott has an appointed legal guardian and has been living at [facility] for 30 years.  Discussion with Buddy, [facility] on 2/6/23 (12-12:15PM 1 & 12:20-12:30 &12:50-12:55): Per Buddy, Legal Guardian is Mark Anthony Zavala ([phone]). Discussed the process in case Mark Anthony is not answering and asked whether there is a backup legal guardian. Buddy reached out to Mark Anthony and confirmed that he is willing and available to make decisions on Mr. Scott’s behalf.    Fax received on 2/6/23 at 11:30 Appointed Legal Guardian Form. Ethics will place it in patient’s chart.    BIOETHICS ANALYSIS:    The central issues in this case pertain to the patient’s autonomy and the practitioner’s beneficence and nonmaleficence.     The principle of respect for autonomous persons acknowledges a patient’s right to hold views, to make choices and to take actions based on their values and beliefs(i). Furthermore, this principle acknowledges the patient’s dignity and bodily integrity(i). Essential to this principle is the capacity for autonomous choice(i). In other words, the patient’s capacity to decide what can and cannot be done to her according to her set of values. When a patient does not have the ability to make decisions for herself, we must protect her autonomy by finding an appropriate surrogate decision maker. According to the [facility] regulations regarding persons with developmental disability for consent to treatment state that if a patient is:   -	eighteen (18) years of age or older and  -	lacks capacity to understand appropriate disclosures regarding proposed medical treatment or   -	a determination of insufficient capacity has been made     the medical team should seek informed consent to the proposed treatment from one of the surrogates below, in that order:  -	a guardian lawfully empowered to give such consent or the person's duly appointed health care agent or alternative agent  -	an actively involved spouse   -	an actively involved parent   -	an actively involved adult child    -	an actively involved adult sibling    -	an actively involved adult family member   -	the Consumer Advisory Board for [place] (only for class members it fully represents)   -	a surrogate decision making committee (SDMC) or a court of competent jurisdiction.(i)    In this case, Mr. Scott has a legal guardian, Mark Anthony Zavala ([phone]) who is willing to make decisions on his behalf.    								  
Consult requested by:	Joy Kerns	Role: MD Service: Medicine Attending: Kelly Denson  Consultant: Jania LYNN     Contact #s: c) [phone]  (o) [phone]      CLINICAL SUMMARY: This is a 77 year-old man with past medical history of developmental delay (nonverbal at baseline), benign prostatic hyperplasia, glaucoma, Hypokalemia, Normal anion gap metabolic acidosis, hypomagnesemia, hypophosphatemia, who was brought by EMS for non-bloody diarrhea, vomiting, and respiratory distress and was admitted for further management. On admission, patient initially required 15L NRB and 4L NC before being weaned to room air. On 2/3/23, a CT scan was performed, who showed suspicion for cecal carcinoma. Escort also notes slight weight loss in the patient in the past few months.  Ethics consult initiated to assist in surrogate decision maker  Prognosis Estimate (survival in days, wks, mos, yrs): unknown  Patient Decision-Making Capacity:  0 Has capacity 	1  Lacks capacity at present   Patient Aware of:  Diagnosis:  0 Yes   0 No  1 Unknown    Prognosis:  0 Yes   0 No  1 Unknown       Name of medical decision-maker should patient lack capacity:	Relationship:   Role:  0 Health Care Proxy     1 Legal Surrogates  Mark Anthony Zavala Contact #(s): [phone]  OTHER STAKEHOLDERS:   Evidence of Patient’s Preference of Life-Sustaining Treatment (Written or Oral):   Resuscitation status:  DNR:  0Yes   1No      DNI:  0Yes   1No    Discussions:   Discussion with Joy Kerns on 2/6/23 (11-11:15AM & 11:15-11:30 AM): Case reviewed and discussed. Medical team looking for a decision maker for this patient. GI consulted, recommends colonoscopy for suspicion of cecal carcinoma.  Discussion with Martha Kirkland, , [facility] on 2/6/23 (1-1:15PM): [facility] is an [facility] licensed facility. Mr. Scott has an appointed legal guardian and has been living at [facility] for 30 years.  Discussion with Buddy, [facility] on 2/6/23 (12-12:15PM 1 & 12:20-12:30 &12:50-12:55): Per Buddy, Legal Guardian is Mark Anthony Zavala ([phone]). Discussed the process in case Mark Anthony is not answering and asked whether there is a backup legal guardian. Buddy reached out to Mark Anthony and confirmed that he is willing and available to make decisions on Mr. Scott’s behalf.     Called Mark Anthony numerous times on 2/6/23: no answer, no voicemail    Called Buddy ([facility] [phone]) and left a text message on 2/6/23 (15:00PM): [facility] has been so far the only point of liaison with Legal Guardian. I asked Buddy to make sure Mark Anthony calls the medical team back.     Discussion with Joy Kerns (Medicine) on 2/7/23 (10:30-10:40 & 11-11:10AM): Case discussed and reviewed. Legal guardian has been unavailable despite numerous calls on 2/6/23 and 2/7/23. [facility] ([facility]) seems to have been the only one to be in touch with him. Per Buddy, legal guardian does not want invasive measures. However, colonoscopy is not considered an invasive measure.     Left a voicemail to [facility] ([facility]) on 2/7/23 (10:55AM)    Discussion with Marcellus Husain (GI) on 2/7/23 (10:40-10:50 AM): Case reviewed. Reviewed risks and benefits of colonoscopy for diagnostic purposes. Once colonoscopy is performed, surgical oncology will be able to provide a staging evaluation. CT chest and CT abdomen does not show any metastatic lesions. Per GI, it is likely that malignant mass is local and could be removed surgically.     Called Mark Anthony (Legal guardian) on 2/7/23 (11:20AM): no answer, no voicemail.    Discussion with [facility] ([facility]) on 2/7/23 (11:30-11:40AM): Buddy related all the necessary information to the legal guardian. I told Buddy that the legal guardian did not call back the medical team and has been unreachable. I communicated with him that the legal guardian needs to be sufficiently available to discuss risks and benefits of mass resection. I made clear that a colonoscopy is not an invasive procedure but serves only diagnostic purposes.      Left a voicemail to [facility] ([facility]) on 2/7/23 (1:05AM): Left a message saying that our legal department will contact [facility].    Discussion with Joy Kerns (Medicine) on 2/7/23 (2:00-2:15PM): Medical team finally spoke with the Legal Guardian. Per the medical team, the legal guardian does not seem to be acting in patient’s best interests. Reasons invoked for refusing colonoscopy were very vague.    Discussion with [facility] ([facility]) on 2/7/23 (3:15-3:17PM): Buddy will bring patient’s medical records.    Left a message to [facility] ([facility]) on 2/8/23 at 11AM to ask if they can provide the actual hearings appointing Mr. Zavala as legal guardian.    Discussion with [facility] on 2/8/23 (2:30-2:35PM): Patient is under [facility].    Discussion with [facility] ([phone]) on 2/8/23 (2:40-2:50PM) Aden Ford is the care manager. Had Mark Anthony Zavala on file, but could find the official document for guardianship.    Discussion with Mark Anthony Zavala on 2/9/23 (10:00-10:10): Legal guardian shared that he didn’t know that colonoscopy was only intended for diagnostic purposes and that he had only refused to consent for surgery. Legal guardian agreed for colonoscopy for diagnostic purposes.    BIOETHICS ANALYSIS:    According to the [facility] regulations regarding persons with developmental disability for consent to treatment state that if a patient is:   -	eighteen (18) years of age or older and  -	lacks capacity to understand appropriate disclosures regarding proposed medical treatment or   -	a determination of insufficient capacity has been made     the medical team should seek informed consent to the proposed treatment from one of the surrogates below, in that order:  -	a guardian lawfully empowered to give such consent or the person's duly appointed health care agent or alternative agent  -	an actively involved spouse   -	an actively involved parent   -	an actively involved adult child    -	an actively involved adult sibling    -	an actively involved adult family member   -	the Consumer Advisory Board for the [employer] (only for class members it fully represents)   -	a surrogate decision making committee (SDMC) or a court of competent jurisdiction.(i)    								      
Unable to obtain complete HPI given pt baseline mental status.  Hx obtain through chart revoew  Unable to reach pt emergency contact for collateral. No answer.    PER HPI  77M hx asthma, developmental delay (nonverbal at baseline), BPH, glaucoma, BIBEMS for nonbloody diarrhea, vomiting, and respiratory distress since yesterday.  EMS reports upon their arrival he was 89% on RA, 92% on NC. placed pt on NRB. Per pt's escort from facility, symptoms began yesterday. States that 2 other people at facility have similar symptoms. Escort states that at baseline pt is unable to communicate pain to staff.    ROS otherwise unobtainable due to mental status but patient appears comfortable, breathing without accessory muscle use or cough.    ED course:  Vitals: T 99.9 F, HR 89, /84, rr 19, SpO2 95% on NRB -->97% on 4L NC  Labs: CBC wnl with 9.6% band neutrophils, PTT 14.2, INR 1.19, K 3.0, HCO3 20, BUN 33, glucose 188, Ca 8.1  Lactate 2.0, lipase 13, Mg 1.7, Trop <0.01,   VBG: K 2.9, Na 134, Ca 1.07, pCO2 36, pO2 50, HCO3 21, O2 sat 78%  COVID/Flu/RSV negative  ECG: NSR@92 bpm, normal axis, QTc 437, no ST-T changes  CT A/P w oral and IV con prelim report shows: small hiatal hernia, suspicious for cecal carcinoma, ileus with findings c/w history of diarrhea.  Interventions: Zofran 4mg x1, MgSO4 2g x1, KCl 40 mEq po & 30 mEq IV, NS 2L bolus    Patient admitted to Medicine for further management. (03 Feb 2023 07:52)    Allergies    No Known Allergies    Intolerances      Home Medications:  Albuterol (Eqv-Proventil HFA) 90 mcg/inh inhalation aerosol: 2 puff(s) inhaled every 6 hours, As Needed (03 Feb 2023 07:58)  aspirin 81 mg oral tablet: 1 tab(s) orally once a day (03 Feb 2023 07:56)  chlorhexidine 0.12% mucous membrane liquid: 15 milliliter(s) mucous membrane 2 times a day (03 Feb 2023 07:59)  finasteride 5 mg oral tablet: 1 tab(s) orally once a day (03 Feb 2023 07:56)  fluticasone 50 mcg/inh nasal spray: 1 spray(s) nasal once a day (03 Feb 2023 07:56)  montelukast 10 mg oral tablet: 1 tab(s) orally once a day (03 Feb 2023 07:54)  Systane Ultra ophthalmic solution: 1 drop(s) to each affected eye 3 times a day (03 Feb 2023 07:57)  tamsulosin 0.4 mg oral capsule: 1 cap(s) orally once a day (03 Feb 2023 07:56)  Thera-M oral tablet: 1 tab(s) orally once a day (03 Feb 2023 07:56)  traZODone 50 mg oral tablet: 1 tab(s) orally once a day (03 Feb 2023 07:55)  triamcinolone 0.1% topical ointment: Apply topically to affected area 2 times a week, As Needed (03 Feb 2023 08:02)  trifluoperazine 5 mg oral tablet: 1 tab(s) orally once a day (03 Feb 2023 07:55)    MEDICATIONS:  MEDICATIONS  (STANDING):  artificial  tears Solution 1 Drop(s) Both EYES every 12 hours  aspirin  chewable 81 milliGRAM(s) Oral daily  chlorhexidine 0.12% Liquid 15 milliLiter(s) Oral Mucosa two times a day  enoxaparin Injectable 40 milliGRAM(s) SubCutaneous every 24 hours  finasteride 5 milliGRAM(s) Oral every 24 hours  fluPHENAZine 2.5 milliGRAM(s) Oral every 12 hours  fluticasone propionate 50 MICROgram(s)/spray Nasal Spray 1 Spray(s) Both Nostrils every 12 hours  lactated ringers. 1000 milliLiter(s) (80 mL/Hr) IV Continuous <Continuous>  montelukast 10 milliGRAM(s) Oral every 24 hours  multivitamin/minerals 1 Tablet(s) Oral every 24 hours  tamsulosin 0.4 milliGRAM(s) Oral at bedtime  traZODone 50 milliGRAM(s) Oral at bedtime    MEDICATIONS  (PRN):  acetaminophen     Tablet .. 650 milliGRAM(s) Oral every 6 hours PRN Temp greater or equal to 38C (100.4F), Mild Pain (1 - 3)  albuterol    90 MICROgram(s) HFA Inhaler 2 Puff(s) Inhalation every 6 hours PRN Shortness of Breath and/or Wheezing    PAST MEDICAL & SURGICAL HISTORY:  Asthma      History of developmental disability      Glaucoma      BPH (benign prostatic hyperplasia)      REVIEW OF SYSTEMS:  Unable to obtain complete ROS due to mental status    Vital Signs Last 24 Hrs  T(C): 36.7 (03 Feb 2023 08:40), Max: 37.7 (03 Feb 2023 03:49)  T(F): 98.1 (03 Feb 2023 08:40), Max: 99.9 (03 Feb 2023 03:49)  HR: 74 (03 Feb 2023 08:40) (74 - 89)  BP: 120/62 (03 Feb 2023 08:40) (120/62 - 141/84)  BP(mean): --  RR: 18 (03 Feb 2023 08:40) (18 - 19)  SpO2: 95% (03 Feb 2023 08:40) (95% - 97%)    Parameters below as of 03 Feb 2023 08:40  Patient On (Oxygen Delivery Method): room air          PHYSICAL EXAM:    General: No acute distress  Eyes: Anicteric sclerae, moist conjunctivae  HENT: Moist mucous membranes  Neck: Trachea midline, supple  Lungs: Normal respiratory effort and no intercostal retractions  Cardiovascular: RRR  Abdomen: Soft, non-tender non-distended; No rebound or guarding  Extremities: No edema, excoriations along LE b/l  Neurological: Alert, not oriented to person/place/time. Baseline mental state per aide     Feces on bed sheet noted to be brown without blood    LABS:                        14.1   4.67  )-----------( 163      ( 03 Feb 2023 04:24 )             40.7     02-03    135  |  101  |  33<H>  ----------------------------<  188<H>  3.0<L>   |  20<L>  |  0.92    Ca    8.1<L>      03 Feb 2023 04:24  Mg     1.7     02-03    TPro  7.3  /  Alb  3.6  /  TBili  0.6  /  DBili  x   /  AST  19  /  ALT  16  /  AlkPhos  72  02-03        PT/INR - ( 03 Feb 2023 04:24 )   PT: 14.2 sec;   INR: 1.19          PTT - ( 03 Feb 2023 04:24 )  PTT:29.3 sec    RADIOLOGY & ADDITIONAL STUDIES:       ACC: [id number] EXAM:  CT ABDOMEN AND PELVIS OC IC   ORDERED BY: DAVID AWAN     PROCEDURE DATE:  02/03/2023          INTERPRETATION:  Attending over read. 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   Findings can be correlated with colonoscopy. Stomach, small bowel and   colon appear distended. Probable ileus. Recommend delayed CT to assess   for transit of contrast material through the small bowel. Prostate   measures 4.8 x 3.4 x 4.0 cm.Circumferential wall thickening of bladder.   Contrast seen in the distal esophagus likely GE reflux. Bilateral   gynecomastia. Trace bilateral pleural effusions. OA right hip.              [facility] RADIOLOGIST PRELIMINARY REPORT      Initial report created on 2/3/2023 6:32:41 AM EST  PROCEDURE INFORMATION:  Exam: CT Abdomen And Pelvis With Contrast  Exam date and time: 2/3/2023 5:39 AM  Age: 77 years old  Clinical indication: N/v/d    TECHNIQUE:  Imaging protocol: Computed tomography of the abdomen and pelvis with   contrast.  Contrast material: IV: ISOVUE 370; Contrast volume: 100 ml; Contrast   route: IV;      COMPARISON:  DX XR CHEST IMMEDIATE 2/3/2023 5:14 AM    FINDINGS:  Lungs: Bilateral lower lobe lung subsegmental atelectasis versus   consolidation.  Right lower lobe and lingular subsegmental atelectasis.  Pleural spaces: Trace bilateral pleural effusions  Diaphragm: Small hiatal hernia.    Liver: Unremarkable  Gallbladder and bile ducts: No calcified stones. No ductal dilation.  Pancreas: Unremarkable  Spleen: Unremarkable  Adrenal glands: Unremarkable  Kidneys and ureters: Right renal cortical 1 x 1 cm cyst with left renal  cortical upper pole hypodense too small to characterize sub cm lesions.  Stomach and bowel: Annular constricting mass in the cecum series 5, image   46.  No intestinal pneumatosis. Proportionally dilated small and large bowel   with  fluid distended rectum. No intestinal pneumatosis  Appendix: The appendix is not identified.    Intraperitoneal space: No free air or ascites.  Vasculature: Atherosclerotic abdominal aorta without aneurysm. Moderate   celiac  artery stenosis due to calcified and noncalcified plaque. Widely patent   SMA and  ESTHELA.  Lymph nodes: No significant adenopathy  Urinary bladder: Urinary bladder is diffusely thick walled likely due to  chronic outlet obstruction.  Reproductive: Prostate gland is enlarged  Bones/joints: Degenerative changes axial spine. Deformed lamina L4-L5   level of  uncertain etiology. Bilateral hip joint osteoarthritis. Left femoral   head, left  ischial sclerotic punctate likely bone island in the absence of symptoms   and  history of primary malignancy.  Soft tissues: Small bilateral fat containing inguinal hernias. Bilateral  gynecomastia    Other findings: Some images are degraded by motion.    IMPRESSION:  1. Small hiatal hernia.  2. Suspicious for cecal carcinoma. No definitive distant metastatic   malignancy  or intestinal obstruction.  3. Ileus with findings consistent with history of diarrhea.  4. Additional findings /limitations as noted.    --- End of Report ---

## 2023-02-09 NOTE — PROGRESS NOTE ADULT - PROBLEM SELECTOR PROBLEM 4
Developmental delay

## 2023-02-09 NOTE — DISCHARGE NOTE NURSING/CASE MANAGEMENT/SOCIAL WORK - PATIENT PORTAL LINK FT
You can access the FollowMyHealth Patient Portal offered by [facility] by registering at the following website: [email or web address]. By joining [facility]’s FollowMyHealth portal, you will also be able to view your health information using other applications (apps) compatible with our system.

## 2023-02-09 NOTE — PROGRESS NOTE ADULT - SUBJECTIVE AND OBJECTIVE BOX
INTERVAL HPI/OVERNIGHT EVENTS:  Patient was seen and examined at bedside. As per overnight team, no o/n events, patient resting comfortably. Pt remains AAOx0. States he is feeling well this AM. Unable to obtain remainder of ROS    VITAL SIGNS:  T(F): 97.8 (02-09-23 @ 06:24)  HR: 71 (02-09-23 @ 06:24)  BP: 120/61 (02-09-23 @ 06:24)  RR: 19 (02-09-23 @ 06:24)  SpO2: 96% (02-09-23 @ 06:24)  Wt(kg): --      PHYSICAL EXAM:    Constitutional: resting comfortably in bed; NAD  Head: NC/AT  Eyes: PERRL, EOMI, anicteric sclera  ENT: no nasal discharge; MMM  Neck: supple; no JVD or thyromegaly  Respiratory: CTA B/L; no W/R/R, no retractions  Cardiac: +S1/S2; RRR; no M/R/G  Gastrointestinal: soft, NT/ND; no rebound or guarding; +BSx4  Extremities: WWP, no clubbing or cyanosis; no peripheral edema.   Musculoskeletal: NROM x4; no joint swelling, tenderness or erythema  Vascular: 2+ radial, DP/PT pulses B/L  Neurologic: AAOx1; CNII-XII grossly intact; no focal deficits    MEDICATIONS  (STANDING):  artificial  tears Solution 1 Drop(s) Both EYES every 12 hours  aspirin  chewable 81 milliGRAM(s) Oral daily  chlorhexidine 0.12% Liquid 15 milliLiter(s) Oral Mucosa two times a day  enoxaparin Injectable 40 milliGRAM(s) SubCutaneous every 24 hours  finasteride 5 milliGRAM(s) Oral every 24 hours  fluPHENAZine 2.5 milliGRAM(s) Oral every 12 hours  fluticasone propionate 50 MICROgram(s)/spray Nasal Spray 1 Spray(s) Both Nostrils every 12 hours  lisinopril 10 milliGRAM(s) Oral daily  montelukast 10 milliGRAM(s) Oral every 24 hours  multivitamin/minerals 1 Tablet(s) Oral every 24 hours  tamsulosin 0.4 milliGRAM(s) Oral at bedtime  traZODone 50 milliGRAM(s) Oral at bedtime    MEDICATIONS  (PRN):  acetaminophen     Tablet .. 650 milliGRAM(s) Oral every 6 hours PRN Temp greater or equal to 38C (100.4F), Mild Pain (1 - 3)  albuterol    90 MICROgram(s) HFA Inhaler 2 Puff(s) Inhalation every 6 hours PRN Shortness of Breath and/or Wheezing      Allergies    No Known Allergies    Intolerances        LABS:                        12.0   5.35  )-----------( 171      ( 09 Feb 2023 05:30 )             36.3     02-08    134<L>  |  99  |  16  ----------------------------<  96  4.5   |  30  |  0.93    Ca    8.9      08 Feb 2023 05:30  Phos  2.8     02-09  Mg     1.9     02-09            RADIOLOGY & ADDITIONAL TESTS:  Reviewed

## 2023-02-09 NOTE — PROGRESS NOTE ADULT - PROBLEM SELECTOR PLAN 7
Home medications finasteride 5 mg po qd, tamsulosin 0.4 mg po qhs  - continue home meds

## 2023-02-14 DIAGNOSIS — E83.42 HYPOMAGNESEMIA: ICD-10-CM

## 2023-02-14 DIAGNOSIS — R62.50 UNSPECIFIED LACK OF EXPECTED NORMAL PHYSIOLOGICAL DEVELOPMENT IN CHILDHOOD: ICD-10-CM

## 2023-02-14 DIAGNOSIS — E83.39 OTHER DISORDERS OF PHOSPHORUS METABOLISM: ICD-10-CM

## 2023-02-14 DIAGNOSIS — N40.0 BENIGN PROSTATIC HYPERPLASIA WITHOUT LOWER URINARY TRACT SYMPTOMS: ICD-10-CM

## 2023-02-14 DIAGNOSIS — E87.6 HYPOKALEMIA: ICD-10-CM

## 2023-02-14 DIAGNOSIS — R11.2 NAUSEA WITH VOMITING, UNSPECIFIED: ICD-10-CM

## 2023-02-14 DIAGNOSIS — H40.9 UNSPECIFIED GLAUCOMA: ICD-10-CM

## 2023-02-14 DIAGNOSIS — A08.11 ACUTE GASTROENTEROPATHY DUE TO NORWALK AGENT: ICD-10-CM

## 2023-02-14 DIAGNOSIS — J45.20 MILD INTERMITTENT ASTHMA, UNCOMPLICATED: ICD-10-CM

## 2023-02-14 DIAGNOSIS — Z20.822 CONTACT WITH AND (SUSPECTED) EXPOSURE TO COVID-19: ICD-10-CM

## 2023-02-14 DIAGNOSIS — C18.0 MALIGNANT NEOPLASM OF CECUM: ICD-10-CM

## 2023-02-14 DIAGNOSIS — K56.7 ILEUS, UNSPECIFIED: ICD-10-CM

## 2023-02-14 DIAGNOSIS — Z79.899 OTHER LONG TERM (CURRENT) DRUG THERAPY: ICD-10-CM

## 2023-02-14 DIAGNOSIS — E87.20 ACIDOSIS, UNSPECIFIED: ICD-10-CM

## 2023-02-21 PROBLEM — J45.909 UNSPECIFIED ASTHMA, UNCOMPLICATED: Chronic | Status: ACTIVE | Noted: 2023-02-03

## 2023-02-21 PROBLEM — N40.0 BENIGN PROSTATIC HYPERPLASIA WITHOUT LOWER URINARY TRACT SYMPTOMS: Chronic | Status: ACTIVE | Noted: 2023-02-03

## 2023-02-21 PROBLEM — Z86.59 PERSONAL HISTORY OF OTHER MENTAL AND BEHAVIORAL DISORDERS: Chronic | Status: ACTIVE | Noted: 2023-02-03

## 2023-02-21 PROBLEM — H40.9 UNSPECIFIED GLAUCOMA: Chronic | Status: ACTIVE | Noted: 2023-02-03

## 2023-02-21 NOTE — CHART NOTE - NSCHARTNOTEFT_GEN_A_CORE
Called legal guardian (Mark Anthony Keatingnberg [phone]) with call going straight to . Left message to callback at [phone] and to ask for the 4U2. Legal & ethics & SW aware and helping with getting in contact with the legal guardian so that medical team can provide informed consent/discuss the case with guardian.
Per Ethics, patient's legal guardian is Mark Anthony Avelar [phone]. Attempted to call but unable to reach guardian at this time, no option to leave VM.
Received a call from patient's legal guardian, Mark Anthony Zavala. States he is the  and former  of the [facility] where Mr. Scott resides. Reports that he is familiar with the patient's reason for admission. When prompted to share his understanding of the patient's hospital course, he replied that the patient was admitted with "norovirus which is just stomach flu" and that he had multiple bouts of diarrhea. I informed him that Mr. Scott has improved with supportive care and is no longer having diarrhea. 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 As we do not see any evidence of metastatic disease at this time, the recommendation is to do a colonoscopy with subsequent resection of the mass. Mr. Zavala states he is "very concerned" as he has previously seen adverse effects of anesthesia with people with developmental delays undergoing procedures. He stated that he has a PhD in healthcare administration and is very aware of guidelines; however does not believe that a colonoscopy would be useful at this time. He repeatedly expressed that "at his age", Mr. Scott would be at high risk of adverse effects with anesthesia and he does not support moving forward with colonoscopy or surgical intervention. When asked if Mr. Scott has had colonoscopies in the past, Mr. Zavala states that he had; however he did not recall the name of the gastroenterologist and reports that Mr. Scott had been seen by multiple providers in the past. He was amenable to scheduling outpatient follow up with our GI department. Additionally, I asked Mr. Zavala about Mr. Scott's code status. He was unable to tell me whether the patient is DNR/DNI and repeatedly asked me to check his records from [facility]. He will ask them to share this information with us. He confirmed that his best callback number is [phone].
Have attempted to reach patient guardian, Buddy, on numerous occasions since discharge.  GI scheduling team has been unable to make contact to schedule close interval f/u for eval of cecal mass.    Contacted Jania Alamo from ethics who is aware of case who has contacted pts guardian and confirmed that they will call to schedule appointment to ensure f/u and appropriate evaluation.    GI scheduling team will also attempt to reach guardian again at number provided    Marcellus Husain DO  Gastroenterology Fellow  Pager: [phone]
Pt continues to recover from norovirus gastroenteritis.  Will need to identify HCP to help guide further diagnostic eval of celiac mass seen on CT    Discussed with primary team who will contact SW as we have been unable to reach emergency contact or group home where pt resides.    GI svc will continue to follow    Marcellus Husain DO  Gastroenterology Fellow  Pager: [phone]

## 2023-02-23 PROBLEM — Z00.00 ENCOUNTER FOR PREVENTIVE HEALTH EXAMINATION: Status: ACTIVE | Noted: 2023-02-23

## 2023-03-03 ENCOUNTER — NON-APPOINTMENT (OUTPATIENT)
Age: 78
End: 2023-03-03

## 2023-03-21 ENCOUNTER — APPOINTMENT (OUTPATIENT)
Dept: GASTROENTEROLOGY | Facility: CLINIC | Age: 78
End: 2023-03-21

## 2023-03-21 VITALS
OXYGEN SATURATION: 98 % | RESPIRATION RATE: 15 BRPM | DIASTOLIC BLOOD PRESSURE: 62 MMHG | WEIGHT: 204 LBS | TEMPERATURE: 97 F | HEART RATE: 86 BPM | SYSTOLIC BLOOD PRESSURE: 95 MMHG

## 2023-03-21 NOTE — PHYSICAL EXAM
[Alert] : alert [No Respiratory Distress] : no respiratory distress [Abdomen Tenderness] : non-tender [Abdomen Soft] : soft

## 2023-03-21 NOTE — HISTORY OF PRESENT ILLNESS
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 Colonoscopy was not pursued in the hospital due to GOC discussions. Pt had colonoscopy scheduled with Dr. Newman for 3/22/23 however it was cancelled so patient is here with the aid for follow up. Per the aid the legal guardian and patient agree to proceed with a colonoscopy. No new complaints.\par No  abdominal pain, nausea, vomiting, diarrhea, constipation, blood in stools.\par \par SH: denies smoking or ETOH\par

## 2023-03-21 NOTE — ASSESSMENT
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 Colonoscopy was not pursued in the hospital due to GOC discussions. Pt had colonoscopy scheduled with Dr. Newman for 3/22/23 however it was cancelled so patient is here with the aid for follow up. Per the aid the legal guardian and patient agree to proceed with a colonoscopy\par \par # Cecal mass\par - per the aid the legal guardian is in agreement for colonoscopic evaluation\par - schedule colonoscopy at [facility]\par - 2 days slow prep with [facility] as tolerated\par - discussed r/b/i/c and need for escort\par \par d/w Dr. Ball and Enrique\par

## 2023-03-22 ENCOUNTER — APPOINTMENT (OUTPATIENT)
Age: 78
End: 2023-03-22

## 2023-05-22 RX ORDER — POLYETHYLENE GLYCOL 3350, SODIUM CHLORIDE, SODIUM BICARBONATE AND POTASSIUM CHLORIDE WITH LEMON FLAVOR 420; 11.2; 5.72; 1.48 G/4L; G/4L; G/4L; G/4L
420 POWDER, FOR SOLUTION ORAL
Qty: 1 | Refills: 0 | Status: ACTIVE | COMMUNITY
Start: 2023-05-22 | End: 1900-01-01

## 2023-05-25 ENCOUNTER — EMERGENCY (EMERGENCY)
Facility: HOSPITAL | Age: 78
LOS: 1 days | Discharge: HOME | End: 2023-05-25
Attending: EMERGENCY MEDICINE | Admitting: EMERGENCY MEDICINE
Payer: MEDICARE

## 2023-05-25 VITALS
TEMPERATURE: 98 F | WEIGHT: 197.09 LBS | HEART RATE: 105 BPM | HEIGHT: 74 IN | OXYGEN SATURATION: 98 % | RESPIRATION RATE: 18 BRPM | SYSTOLIC BLOOD PRESSURE: 142 MMHG | DIASTOLIC BLOOD PRESSURE: 99 MMHG

## 2023-05-25 DIAGNOSIS — Z87.438 PERSONAL HISTORY OF OTHER DISEASES OF MALE GENITAL ORGANS: ICD-10-CM

## 2023-05-25 DIAGNOSIS — Z79.82 LONG TERM (CURRENT) USE OF ASPIRIN: ICD-10-CM

## 2023-05-25 DIAGNOSIS — Z86.69 PERSONAL HISTORY OF OTHER DISEASES OF THE NERVOUS SYSTEM AND SENSE ORGANS: ICD-10-CM

## 2023-05-25 DIAGNOSIS — T47.2X1A: ICD-10-CM

## 2023-05-25 DIAGNOSIS — R62.50 UNSPECIFIED LACK OF EXPECTED NORMAL PHYSIOLOGICAL DEVELOPMENT IN CHILDHOOD: ICD-10-CM

## 2023-05-25 DIAGNOSIS — J45.909 UNSPECIFIED ASTHMA, UNCOMPLICATED: ICD-10-CM

## 2023-05-25 PROCEDURE — 99285 EMERGENCY DEPT VISIT HI MDM: CPT

## 2023-05-25 PROCEDURE — 99283 EMERGENCY DEPT VISIT LOW MDM: CPT

## 2023-05-25 NOTE — ED ADULT TRIAGE NOTE - CHIEF COMPLAINT QUOTE
accidentally took  2 Dulcolax  suppository  by mouth tonight instead of rectally; was told to come to ER for evaluation; denies abd pain; scheduled for colonoscopy this am

## 2023-05-26 ENCOUNTER — APPOINTMENT (OUTPATIENT)
Dept: GASTROENTEROLOGY | Facility: HOSPITAL | Age: 78
End: 2023-05-26

## 2023-05-26 ENCOUNTER — TRANSCRIPTION ENCOUNTER (OUTPATIENT)
Age: 78
End: 2023-05-26

## 2023-05-26 ENCOUNTER — OUTPATIENT (OUTPATIENT)
Dept: OUTPATIENT SERVICES | Facility: HOSPITAL | Age: 78
LOS: 1 days | Discharge: HOME | End: 2023-05-26
Payer: MEDICARE

## 2023-05-26 VITALS
SYSTOLIC BLOOD PRESSURE: 119 MMHG | WEIGHT: 203.93 LBS | OXYGEN SATURATION: 95 % | RESPIRATION RATE: 16 BRPM | HEART RATE: 95 BPM | DIASTOLIC BLOOD PRESSURE: 67 MMHG | TEMPERATURE: 97 F

## 2023-05-26 PROCEDURE — 45378 DIAGNOSTIC COLONOSCOPY: CPT

## 2023-05-26 NOTE — ED PROVIDER NOTE - CLINICAL SUMMARY MEDICAL DECISION MAKING FREE TEXT BOX
accidental  ingestion of dulcolax suppository, no vomiting, no abd pain on exam.   spoke with poison control - no concern at this time - no negative effects. no interventions needed.  will discharge pt home

## 2023-05-26 NOTE — ED PROVIDER NOTE - OBJECTIVE STATEMENT
77M hx developmental delay, asthma, bph, states accidentally took 2 dulcolax by mouth tonight. was supposed to use them as suppository for colonoscopy in the morning. no vomiting, no abd pain.
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
## 2023-05-26 NOTE — ED ADULT NURSE NOTE - NSFALLUNIVINTERV_ED_ALL_ED
Bed/Stretcher in lowest position, wheels locked, appropriate side rails in place/Call bell, personal items and telephone in reach/Instruct patient to call for assistance before getting out of bed/chair/stretcher/Non-slip footwear applied when patient is off stretcher/[place] to call system/Physically safe environment - no spills, clutter or unnecessary equipment/Purposeful proactive rounding/Room/bathroom lighting operational, light cord in reach

## 2023-05-26 NOTE — ED PROVIDER NOTE - PATIENT PORTAL LINK FT
You can access the FollowMyHealth Patient Portal offered by [facility] by registering at the following website: [email or web address]. By joining [employer]’s FollowMyHealth portal, you will also be able to view your health information using other applications (apps) compatible with our system.

## 2023-05-26 NOTE — ED PROVIDER NOTE - NSFOLLOWUPINSTRUCTIONS_ED_ALL_ED_FT
There will not be negative effects from swallowing dulcolax rather than using it as a suppository.    Nontoxic Ingestion, Adult  Nontoxic ingestion happens when a person swallows, or ingests, a substance that is not likely to cause serious medical problems (is nontoxic). Nontoxic ingestion involves a substance that is not food (is not edible) and is not poisonous. Some nontoxic substances can cause harm if a large enough amount is ingested.    Commonly ingested nontoxic substances include:  Chalk, ink, pencils, and water-based paint.  Birth control (contraceptive)pills and vitamins without iron.  Dog or cat food.  Lipstick or other makeup, perfume, and small amounts of non-medicated hair products.  Petroleum jelly and other gels or creams used on skin (topical ointments).  Potting soil, dirt, or insects.  Shaving cream and talcum powder.    Call your local poison control center at [phone] if you are not sure whether the substance that you swallowed is nontoxic or toxic. If you develop severe symptoms, get help right away.    What are the causes?  Most of the time, nontoxic ingestion is accidental. For example, some hairspray or makeup may get in your mouth by accident, or you may mistake a birth control pill for another medicine.    What are the signs or symptoms?  Nontoxic ingestion usually does not cause symptoms. It may leave a bad taste in your mouth. Spitting or gagging may also occur. Sometimes, it can take a while for the effects of medicines or other substances to develop.    How is this diagnosed?  This condition is diagnosed based on your symptoms, if any, and your medical history. Your health care provider will ask about the substance that you ingested, including how much you swallowed and when you swallowed it. He or she will also do a physical exam to check for symptoms of poisoning.    How is this treated?  Treatment is not usually needed for this condition. Your health care provider may monitor you for a time to make sure symptoms do not develop.    Follow these instructions at home:  Eating and drinking    Follow instructions from your health care provider about eating or drinking restrictions.  If you have vomited after ingesting the substance, you may need to wait a few hours before eating. Start with small sips of clear liquids until your stomach settles.  Drink enough fluid to keep your urine pale yellow.    General instructions    Watch for any change in your condition or for new symptoms.  Take over-the-counter and prescription medicines only as told by your health care provider.  Keep all follow-up visits as told by your health care provider. This is important.    How is this prevented?  Make sure all pills and vitamins are clearly labeled.  Store products and medicines in their original containers.  Read directions for medicines carefully.  Store makeup and non-edible products away from food and medicines.  Read all directions for hair and makeup products carefully.  Use aerosol products in an area that has a lot of airflow.  Where to find more information  Poison Control: poison.org    Contact a health care provider if:  You have any new or worse symptoms.  You have a fever.  You vomit.  You cough.    Get help right away if you have:  Trouble walking, swallowing, or breathing.  Chest pain, or fast or irregular heartbeats.  Severe tiredness (fatigue) or weakness.  A seizure.  A lot of sweating.  Pain in your abdomen, repeated vomiting, or severe diarrhea.  Any bleeding from the mouth or rectum.  Any signs of dehydration. These may include:  Severe thirst.  Dry lips and mouth.  Dizziness or confusion.  Dark urine or no urge to urinate.  Fast breathing or fast pulse.  These symptoms may represent a serious problem that is an emergency. Do not wait to see if the symptoms will go away. Get medical help right away. Call your local emergency services (911 in the [place] Do not drive yourself to the hospital.    Summary  Nontoxic ingestion occurs when you swallow (ingest) a substance that is not likely to cause serious medical problems (is nontoxic).  Nontoxic ingestion involves a substance that is not food (is not edible) and is not poisonous.  Commonly ingested nontoxic items include makeup, ink, shaving cream, dog or cat food, and vitamins that do not contain iron.  Nontoxic ingestion usually does not need treatment, but your health care provider may monitor you for a time to make sure symptoms do not develop.  Always contact the poison control center if you have questions about a substance that you swallowed.  This information is not intended to replace advice given to you by your health care provider. Make sure you discuss any questions you have with your health care provider.
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
## 2023-09-03 ENCOUNTER — EMERGENCY (EMERGENCY)
Facility: HOSPITAL | Age: 78
LOS: 1 days | Discharge: HOME | End: 2023-09-03
Attending: STUDENT IN AN ORGANIZED HEALTH CARE EDUCATION/TRAINING PROGRAM | Admitting: STUDENT IN AN ORGANIZED HEALTH CARE EDUCATION/TRAINING PROGRAM
Payer: MEDICARE

## 2023-09-03 VITALS
SYSTOLIC BLOOD PRESSURE: 164 MMHG | TEMPERATURE: 98 F | RESPIRATION RATE: 18 BRPM | DIASTOLIC BLOOD PRESSURE: 88 MMHG | HEART RATE: 86 BPM | OXYGEN SATURATION: 99 %

## 2023-09-03 VITALS
WEIGHT: 195.99 LBS | SYSTOLIC BLOOD PRESSURE: 146 MMHG | HEART RATE: 96 BPM | TEMPERATURE: 98 F | HEIGHT: 74 IN | DIASTOLIC BLOOD PRESSURE: 72 MMHG | RESPIRATION RATE: 16 BRPM | OXYGEN SATURATION: 97 %

## 2023-09-03 DIAGNOSIS — T22.112A BURN OF FIRST DEGREE OF LEFT FOREARM, INITIAL ENCOUNTER: ICD-10-CM
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
## 2023-09-03 DIAGNOSIS — T31.0 BURNS INVOLVING LESS THAN 10% OF BODY SURFACE: ICD-10-CM

## 2023-09-03 DIAGNOSIS — T22.111A BURN OF FIRST DEGREE OF RIGHT FOREARM, INITIAL ENCOUNTER: ICD-10-CM

## 2023-09-03 DIAGNOSIS — N40.0 BENIGN PROSTATIC HYPERPLASIA WITHOUT LOWER URINARY TRACT SYMPTOMS: ICD-10-CM

## 2023-09-03 DIAGNOSIS — F79 UNSPECIFIED INTELLECTUAL DISABILITIES: ICD-10-CM

## 2023-09-03 DIAGNOSIS — J45.909 UNSPECIFIED ASTHMA, UNCOMPLICATED: ICD-10-CM
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
## 2023-09-03 DIAGNOSIS — Z79.82 LONG TERM (CURRENT) USE OF ASPIRIN: ICD-10-CM

## 2023-09-03 LAB
ANION GAP SERPL CALC-SCNC: 9 MMOL/L — SIGNIFICANT CHANGE UP (ref 5–17)
BASOPHILS # BLD AUTO: 0.03 K/UL — SIGNIFICANT CHANGE UP (ref 0–0.2)
BASOPHILS NFR BLD AUTO: 0.5 % — SIGNIFICANT CHANGE UP (ref 0–2)
BUN SERPL-MCNC: 23 MG/DL — SIGNIFICANT CHANGE UP (ref 7–23)
CALCIUM SERPL-MCNC: 8.4 MG/DL — SIGNIFICANT CHANGE UP (ref 8.4–10.5)
CHLORIDE SERPL-SCNC: 101 MMOL/L — SIGNIFICANT CHANGE UP (ref 96–108)
CO2 SERPL-SCNC: 23 MMOL/L — SIGNIFICANT CHANGE UP (ref 22–31)
CREAT SERPL-MCNC: 0.77 MG/DL — SIGNIFICANT CHANGE UP (ref 0.5–1.3)
EGFR: 91 ML/MIN/1.73M2 — SIGNIFICANT CHANGE UP
EOSINOPHIL # BLD AUTO: 0.13 K/UL — SIGNIFICANT CHANGE UP (ref 0–0.5)
EOSINOPHIL NFR BLD AUTO: 2.3 % — SIGNIFICANT CHANGE UP (ref 0–6)
GLUCOSE SERPL-MCNC: 151 MG/DL — HIGH (ref 70–99)
HCT VFR BLD CALC: 36.1 % — LOW (ref 39–50)
HGB BLD-MCNC: 11.9 G/DL — LOW (ref 13–17)
IMM GRANULOCYTES NFR BLD AUTO: 0.2 % — SIGNIFICANT CHANGE UP (ref 0–0.9)
LYMPHOCYTES # BLD AUTO: 1.18 K/UL — SIGNIFICANT CHANGE UP (ref 1–3.3)
LYMPHOCYTES # BLD AUTO: 21 % — SIGNIFICANT CHANGE UP (ref 13–44)
MCHC RBC-ENTMCNC: 33 GM/DL — SIGNIFICANT CHANGE UP (ref 32–36)
MCHC RBC-ENTMCNC: 33.1 PG — SIGNIFICANT CHANGE UP (ref 27–34)
MCV RBC AUTO: 100.3 FL — HIGH (ref 80–100)
MONOCYTES # BLD AUTO: 0.45 K/UL — SIGNIFICANT CHANGE UP (ref 0–0.9)
MONOCYTES NFR BLD AUTO: 8 % — SIGNIFICANT CHANGE UP (ref 2–14)
NEUTROPHILS # BLD AUTO: 3.83 K/UL — SIGNIFICANT CHANGE UP (ref 1.8–7.4)
NEUTROPHILS NFR BLD AUTO: 68 % — SIGNIFICANT CHANGE UP (ref 43–77)
NRBC # BLD: 0 /100 WBCS — SIGNIFICANT CHANGE UP (ref 0–0)
PLATELET # BLD AUTO: 150 K/UL — SIGNIFICANT CHANGE UP (ref 150–400)
POTASSIUM SERPL-MCNC: 4.1 MMOL/L — SIGNIFICANT CHANGE UP (ref 3.5–5.3)
POTASSIUM SERPL-SCNC: 4.1 MMOL/L — SIGNIFICANT CHANGE UP (ref 3.5–5.3)
RBC # BLD: 3.6 M/UL — LOW (ref 4.2–5.8)
RBC # FLD: 13.1 % — SIGNIFICANT CHANGE UP (ref 10.3–14.5)
SODIUM SERPL-SCNC: 133 MMOL/L — LOW (ref 135–145)
WBC # BLD: 5.63 K/UL — SIGNIFICANT CHANGE UP (ref 3.8–10.5)
WBC # FLD AUTO: 5.63 K/UL — SIGNIFICANT CHANGE UP (ref 3.8–10.5)

## 2023-09-03 PROCEDURE — 70481 CT ORBIT/EAR/FOSSA W/DYE: CPT | Mod: MA

## 2023-09-03 PROCEDURE — 85025 COMPLETE CBC W/AUTO DIFF WBC: CPT

## 2023-09-03 PROCEDURE — 80048 BASIC METABOLIC PNL TOTAL CA: CPT

## 2023-09-03 PROCEDURE — 99284 EMERGENCY DEPT VISIT MOD MDM: CPT | Mod: 25

## 2023-09-03 PROCEDURE — 99285 EMERGENCY DEPT VISIT HI MDM: CPT

## 2023-09-03 PROCEDURE — 96374 THER/PROPH/DIAG INJ IV PUSH: CPT | Mod: XU

## 2023-09-03 PROCEDURE — 70481 CT ORBIT/EAR/FOSSA W/DYE: CPT | Mod: 26,MA

## 2023-09-03 PROCEDURE — 36415 COLL VENOUS BLD VENIPUNCTURE: CPT

## 2023-09-03 RX ORDER — ACETAMINOPHEN 500 MG
1000 TABLET ORAL ONCE
Refills: 0 | Status: COMPLETED | OUTPATIENT
Start: 2023-09-03 | End: 2023-09-03

## 2023-09-03 RX ADMIN — Medication 400 MILLIGRAM(S): at 07:59
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
## 2023-09-03 NOTE — ED PROVIDER NOTE - PATIENT PORTAL LINK FT
You can access the FollowMyHealth Patient Portal offered by [facility] by registering at the following website: [email or web address]. By joining [employer]’s FollowMyHealth portal, you will also be able to view your health information using other applications (apps) compatible with our system.
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
## 2023-09-03 NOTE — ED ADULT NURSE NOTE - CAS ELECT INFOMATION PROVIDED
caregiver asked questions about worsening of symptoms, s&s of needing to return. She was educated on patient needing to return if redness spread, swelling, fever, chills, NVD, sob, chest pain, dizziness, weakness or change from baseline./DC instructions
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
## 2023-09-03 NOTE — ED PROVIDER NOTE - NSFOLLOWUPINSTRUCTIONS_ED_ALL_ED_FT
Please apply compresses to the area. You can apply aloe vera to the area as well. Take tylenol or motrin as needed. You should also take augmentin as prescribed. Return for worsening symptoms, swelling. Otherwise, please follow up with your primary physician.    I hope you feel better soon!    Sincerely,  Princess Weiner MD

## 2023-09-03 NOTE — ED ADULT NURSE NOTE - NSFALLUNIVINTERV_ED_ALL_ED
Bed/Stretcher in lowest position, wheels locked, appropriate side rails in place/Call bell, personal items and telephone in reach/Instruct patient to call for assistance before getting out of bed/chair/stretcher/Non-slip footwear applied when patient is off stretcher/[place] to call system/Physically safe environment - no spills, clutter or unnecessary equipment/Purposeful proactive rounding/Room/bathroom lighting operational, light cord in reach

## 2023-12-05 ENCOUNTER — APPOINTMENT (OUTPATIENT)
Dept: GASTROENTEROLOGY | Facility: CLINIC | Age: 78
End: 2023-12-05
Payer: MEDICARE

## 2023-12-05 VITALS
RESPIRATION RATE: 15 BRPM | HEART RATE: 84 BPM | WEIGHT: 190 LBS | DIASTOLIC BLOOD PRESSURE: 60 MMHG | SYSTOLIC BLOOD PRESSURE: 117 MMHG | TEMPERATURE: 97 F | OXYGEN SATURATION: 98 %

## 2023-12-05 DIAGNOSIS — R93.5 ABNORMAL FINDINGS ON DIAGNOSTIC IMAGING OF OTHER ABDOMINAL REGIONS, INCLUDING RETROPERITONEUM: ICD-10-CM

## 2023-12-05 PROCEDURE — 99214 OFFICE O/P EST MOD 30 MIN: CPT

## 2023-12-06 ENCOUNTER — RESULT REVIEW (OUTPATIENT)
Age: 78
End: 2023-12-06

## 2023-12-19 ENCOUNTER — APPOINTMENT (OUTPATIENT)
Dept: CT IMAGING | Facility: HOSPITAL | Age: 78
End: 2023-12-19

## 2023-12-19 ENCOUNTER — OUTPATIENT (OUTPATIENT)
Dept: OUTPATIENT SERVICES | Facility: HOSPITAL | Age: 78
LOS: 1 days | End: 2023-12-19
Payer: MEDICARE

## 2023-12-19 PROCEDURE — 82565 ASSAY OF CREATININE: CPT

## 2023-12-19 PROCEDURE — 74177 CT ABD & PELVIS W/CONTRAST: CPT | Mod: 26

## 2023-12-19 PROCEDURE — 74177 CT ABD & PELVIS W/CONTRAST: CPT

## 2023-12-20 LAB
POCT ISTAT CREATININE: 1 MG/DL — SIGNIFICANT CHANGE UP (ref 0.5–1.3)
POCT ISTAT CREATININE: 1 MG/DL — SIGNIFICANT CHANGE UP (ref 0.5–1.3)

## 2023-12-21 ENCOUNTER — NON-APPOINTMENT (OUTPATIENT)
Age: 78
End: 2023-12-21

## 2024-01-08 NOTE — PATIENT PROFILE ADULT - BILL PAYMENT
Per [facility] transfer paperwork/orders:    RUBEN, ground w thin liquids diet + "fortified pudding" 4oz PO 2x daily + LPS 30mL PO 4x daily     *no noted tube feeding ordered. no

## 2024-02-15 ENCOUNTER — APPOINTMENT (OUTPATIENT)
Dept: ENDOCRINOLOGY | Facility: CLINIC | Age: 79
End: 2024-02-15
Payer: MEDICARE

## 2024-02-15 VITALS — SYSTOLIC BLOOD PRESSURE: 118 MMHG | DIASTOLIC BLOOD PRESSURE: 66 MMHG | WEIGHT: 186 LBS | HEART RATE: 80 BPM

## 2024-02-15 PROCEDURE — 36415 COLL VENOUS BLD VENIPUNCTURE: CPT

## 2024-02-15 PROCEDURE — 99205 OFFICE O/P NEW HI 60 MIN: CPT

## 2024-02-16 LAB
ALBUMIN SERPL ELPH-MCNC: 3.7 G/DL
ALP BLD-CCNC: 92 U/L
ALT SERPL-CCNC: 11 U/L
ANION GAP SERPL CALC-SCNC: 11 MMOL/L
AST SERPL-CCNC: 18 U/L
BASOPHILS # BLD AUTO: 0.03 K/UL
BASOPHILS NFR BLD AUTO: 0.5 %
BILIRUB SERPL-MCNC: 0.2 MG/DL
BUN SERPL-MCNC: 22 MG/DL
CALCIUM SERPL-MCNC: 9 MG/DL
CHLORIDE SERPL-SCNC: 99 MMOL/L
CO2 SERPL-SCNC: 26 MMOL/L
CREAT SERPL-MCNC: 0.8 MG/DL
EGFR: 90 ML/MIN/1.73M2
EOSINOPHIL # BLD AUTO: 0.18 K/UL
EOSINOPHIL NFR BLD AUTO: 2.8 %
GLUCOSE SERPL-MCNC: 114 MG/DL
HCT VFR BLD CALC: 35.9 %
HGB BLD-MCNC: 12 G/DL
IMM GRANULOCYTES NFR BLD AUTO: 0.3 %
LYMPHOCYTES # BLD AUTO: 1.63 K/UL
LYMPHOCYTES NFR BLD AUTO: 24.9 %
MAN DIFF?: NORMAL
MCHC RBC-ENTMCNC: 32.6 PG
MCHC RBC-ENTMCNC: 33.4 GM/DL
MCV RBC AUTO: 97.6 FL
MONOCYTES # BLD AUTO: 0.68 K/UL
MONOCYTES NFR BLD AUTO: 10.4 %
NEUTROPHILS # BLD AUTO: 4 K/UL
NEUTROPHILS NFR BLD AUTO: 61.1 %
PLATELET # BLD AUTO: 190 K/UL
POTASSIUM SERPL-SCNC: 4.4 MMOL/L
PROT SERPL-MCNC: 6.3 G/DL
RBC # BLD: 3.68 M/UL
RBC # FLD: 13.3 %
SODIUM SERPL-SCNC: 136 MMOL/L
T3 SERPL-MCNC: 105 NG/DL
T4 FREE SERPL-MCNC: 1 NG/DL
THYROGLOB AB SERPL-ACNC: <20 IU/ML
THYROPEROXIDASE AB SERPL IA-ACNC: 27.3 IU/ML
TSH SERPL-ACNC: 5.02 UIU/ML
WBC # FLD AUTO: 6.54 K/UL

## 2024-02-16 NOTE — REASON FOR VISIT
[Initial Evaluation] : an initial evaluation [Hypothyroidism] : hypothyroidism [Formal Caregiver] : formal caregiver [FreeTextEntry2] : Jade Boo NP phone: [phone], [phone]

## 2024-02-16 NOTE — ADDENDUM
[FreeTextEntry1] : I, Enmanuel You act solely as a scribe for Dr. Rene Clifford on this date 02/15/2024  February 15., 2024 labs report reviewed:  T4 free 1., T3 total 105, and TSH 5.02 (0.27-4.20) During his exam yesterday he appears to be clinically. A TSH increases gradually with age.  In older untreated individuals, the upper limit of TSH is ~ 7.5 mU/L as opposed to 4.0 mU/L in the younger age groups Recommend to check TFT in 3 months

## 2024-02-16 NOTE — CONSULT LETTER
[Dear  ___] : Dear  [unfilled], [Consult Letter:] : I had the pleasure of evaluating your patient, [unfilled]. [Sincerely,] : Sincerely, [FreeTextEntry3] : Rene Clifford

## 2024-02-16 NOTE — HISTORY OF PRESENT ILLNESS
[FreeTextEntry1] : 78 year old M pt, with Hx of Hypothyroidism, referred by Jade Boo NP phone: [phone], [phone], presents today to establish endocrine care.  NKDA   02/15/2024  /66 Pt presents today for an endocrine evaluation. His chart reveals that on 12/05/23, he underwent a GI workup for cecal mass and received CAT scans.  Pt is not responsive to questions regarding his health. He appears calm and is accompanied by a personal care aide. According to his referral form, he presents with Hypothyroidism with a TSH of 15.   Review of Labs:  01/05/24 TSH 15.2 A1c 5.7%  [Medications verified as per pt on 02/15/2024] Current Medications: Ventolin, Trazadone 50 mg, Lisinopril 10 mg, Levothyroxine 50 mcg (1st dose given), Flonase 50 mcg, multivitamins.

## 2024-02-16 NOTE — END OF VISIT
[Time Spent: ___ minutes] : I have spent [unfilled] minutes of time on the encounter. [FreeTextEntry3] :  All medical record entries made by the Scribe were at my, Dr. Rene Clifford, direction and personally dictated by me on 02/15/2024. I have reviewed the chart and agree that the record accurately reflects my personal performance of the history, physical exam, assessment and plan. I have also personally directed, reviewed and agreed with the chart.

## 2024-02-16 NOTE — PHYSICAL EXAM
[No Acute Distress] : no acute distress [Normal Sclera/Conjunctiva] : normal sclera/conjunctiva [No Proptosis] : no proptosis [Normal Outer Ear/Nose] : the ears and nose were normal in appearance [Thyroid Not Enlarged] : the thyroid was not enlarged [No Thyroid Nodules] : no palpable thyroid nodules [Clear to Auscultation] : lungs were clear to auscultation bilaterally [Normal Rate] : heart rate was normal [No Edema] : no peripheral edema [Soft] : abdomen soft [No Spinal Tenderness] : no spinal tenderness [Spine Straight] : spine straight [No Stigmata of Cushings Syndrome] : no stigmata of Cushings Syndrome [Normal Gait] : normal gait [Normal Strength/Tone] : muscle strength and tone were normal [No Rash] : no rash [Normal Reflexes] : deep tendon reflexes were 2+ and symmetric [No Tremors] : no tremors [Oriented x3] : oriented to person, place, and time [Acanthosis Nigricans] : no acanthosis nigricans [de-identified] : No thyroid enlargement, no tenderness.
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
## 2024-04-11 NOTE — ED ADULT NURSE NOTE - NSIMPLEMENTINTERV_GEN_ALL_ED
Subjective:   Armida Bob   41 y.o.   1982     Refered by: No referring provider defined for this encounter.     New Patient Visit  Chief Compliant: neck swelling    History of Present Illness:  Armida Bob is a 41 y.o. female with past medical history of asthma, allergic rhinitis, who presents today for evaluation of neck swelling.     Patient was seen in the emergency room on 1/14/2024 for evaluation of neck swelling.  Previously she was seen at an outside ER for neck swelling and was given course of amoxicillin and steroids, which resolved the issue.  But has started swelling again 1 week ago.  Was given she received a course of steroids and antibiotics from Dr. Nikolay Reaves in the emergency room, which resolved her issues.  But she is coming in for evaluation of recurrent swelling.    No prior imaging at time of evaluation there is no neck swelling, exam is benign.  Patient describes the swelling as midline neck mass in submental region.  At its worst patient had some difficulty with breathing, but resolved quickly after treatment.  Denies any dental issues.    ED notes reviewed.    Interval Hx:   2/12/24:   Patient had recurrent episode of anterior neck swelling and pain.  Was seen at emergency room on 2/7/2024, CT scan was consistent with a infected thyroglossal duct cyst.  Patient was started on antibiotics, with significant improvement in pain.  Is currently taking Augmentin still    ED notes reviewed, CAT scan imaging reviewed    3/27/24:   Patient had another recent infection of her thyroglossal duct cyst.  Now status post an additional course of Augmentin (ordered by me on 3/4/24) with resolution in infection and pain.  Scheduled for surgery on 4/4/2024 for resection of thyroglossal duct cyst.  All questions answered.    4/8/24:   S/p Sistrunk procedure on 4/4/24. Doing well post operative, no issues.     Review of Systems  Consitutional: denies fever, excessive weight gain or loss.  Eyes: denies  Implemented All Fall with Harm Risk Interventions:  [place] to call system. Call bell, personal items and telephone within reach. Instruct patient to call for assistance. Room bathroom lighting operational. Non-slip footwear when patient is off stretcher. Physically safe environment: no spills, clutter or unnecessary equipment. Stretcher in lowest position, wheels locked, appropriate side rails in place. Provide visual cue, wrist band, yellow gown, etc. Monitor gait and stability. Monitor for mental status changes and reorient to person, place, and time. Review medications for side effects contributing to fall risk. Reinforce activity limits and safety measures with patient and family. Provide visual clues: red socks.

## 2024-05-02 ENCOUNTER — APPOINTMENT (OUTPATIENT)
Dept: ENDOCRINOLOGY | Facility: CLINIC | Age: 79
End: 2024-05-02
Payer: MEDICARE

## 2024-05-02 ENCOUNTER — APPOINTMENT (OUTPATIENT)
Dept: ENDOCRINOLOGY | Facility: CLINIC | Age: 79
End: 2024-05-02

## 2024-05-02 VITALS — HEART RATE: 86 BPM | WEIGHT: 186 LBS | SYSTOLIC BLOOD PRESSURE: 101 MMHG | DIASTOLIC BLOOD PRESSURE: 59 MMHG

## 2024-05-02 DIAGNOSIS — E03.8 OTHER SPECIFIED HYPOTHYROIDISM: ICD-10-CM

## 2024-05-02 PROCEDURE — 36415 COLL VENOUS BLD VENIPUNCTURE: CPT

## 2024-05-02 PROCEDURE — 99213 OFFICE O/P EST LOW 20 MIN: CPT | Mod: 25

## 2024-05-06 NOTE — REASON FOR VISIT
[Follow - Up] : a follow-up visit [Hypothyroidism] : hypothyroidism [Formal Caregiver] : formal caregiver [FreeTextEntry2] : Jade Boo NP

## 2024-05-06 NOTE — ADDENDUM
[FreeTextEntry1] : VERONICA, Patricia Brock, am scribing for an in the presence of  Dr. Rene Clifford on this date in the following sections HISTORY OF PRESENT ILLNESS, PAST MEDICAL/FAMILY/SOCIAL HISTORY; REVIEW OF SYSTEMS; VITAL SIGNS; PHYSICAL EXAM; ASSESSMENT/PLAN.

## 2024-05-06 NOTE — HISTORY OF PRESENT ILLNESS
[FreeTextEntry1] : 78 year old M pt, with Hx of Hypothyroidism, referred by Jade Boo NP phone: [phone], [phone], presents today to establish endocrine care.  NKDA   02/15/2024  /66 Pt presents today for an endocrine evaluation. His chart reveals that on 12/05/23, he underwent a GI workup for cecal mass and received CAT scans.  Pt is not responsive to questions regarding his health. He appears calm and is accompanied by a personal care aide. According to his referral form, he presents with Hypothyroidism with a TSH of 15.    05/02/2024 Pt presents with health aide. She reports the patient is doing well with no complaints.    [Medications verified on 05/02/2024 as per pt]  Current Medications: Ventolin, Trazadone 50 mg, Lisinopril 10 mg, Flonase 50 mcg, multivitamins. - Levothyroxine 50 mcg (1st dose given " a long time ago"), 
same name as above

## 2024-05-06 NOTE — PHYSICAL EXAM
[No Acute Distress] : no acute distress [Normal Sclera/Conjunctiva] : normal sclera/conjunctiva [No Proptosis] : no proptosis [Normal Outer Ear/Nose] : the ears and nose were normal in appearance [Thyroid Not Enlarged] : the thyroid was not enlarged [No Thyroid Nodules] : no palpable thyroid nodules [Clear to Auscultation] : lungs were clear to auscultation bilaterally [Normal Rate] : heart rate was normal [No Edema] : no peripheral edema [Soft] : abdomen soft [No Spinal Tenderness] : no spinal tenderness [Spine Straight] : spine straight [No Stigmata of Cushings Syndrome] : no stigmata of Cushings Syndrome [Normal Gait] : normal gait [Normal Strength/Tone] : muscle strength and tone were normal [No Rash] : no rash [Normal Reflexes] : deep tendon reflexes were 2+ and symmetric [No Tremors] : no tremors [Oriented x3] : oriented to person, place, and time [Acanthosis Nigricans] : no acanthosis nigricans [de-identified] : No thyroid enlargement, no tenderness.

## 2024-05-21 LAB
T4 FREE SERPL-MCNC: 1 NG/DL
TSH SERPL-ACNC: 5.11 UIU/ML

## 2024-08-02 ENCOUNTER — TRANSCRIPTION ENCOUNTER (OUTPATIENT)
Age: 79
End: 2024-08-02

## 2024-08-02 DIAGNOSIS — E03.8 OTHER SPECIFIED HYPOTHYROIDISM: ICD-10-CM

## 2024-08-13 ENCOUNTER — APPOINTMENT (OUTPATIENT)
Dept: ENDOCRINOLOGY | Facility: CLINIC | Age: 79
End: 2024-08-13
Payer: MEDICARE

## 2024-08-13 VITALS — DIASTOLIC BLOOD PRESSURE: 67 MMHG | HEART RATE: 78 BPM | WEIGHT: 177 LBS | SYSTOLIC BLOOD PRESSURE: 125 MMHG

## 2024-08-13 DIAGNOSIS — E03.8 OTHER SPECIFIED HYPOTHYROIDISM: ICD-10-CM

## 2024-08-13 PROCEDURE — 36415 COLL VENOUS BLD VENIPUNCTURE: CPT

## 2024-08-13 PROCEDURE — 99214 OFFICE O/P EST MOD 30 MIN: CPT | Mod: 25

## 2024-08-13 NOTE — HISTORY OF PRESENT ILLNESS
[FreeTextEntry1] : 78 year old M pt, with Hx of Hypothyroidism, referred by Jade Boo NP phone: [phone], [phone], presents today to establish endocrine care.  NKDA   02/15/2024  /66 Pt presents today for an endocrine evaluation. His chart reveals that on 12/05/23, he underwent a GI workup for cecal mass and received CAT scans.  Pt is not responsive to questions regarding his health. He appears calm and is accompanied by a personal care aide. According to his referral form, he presents with Hypothyroidism with a TSH of 15.    05/02/2024 Pt presents with health aide. He reports the patient is doing well with no complaints.   08/13/2024  Pt has /67. Pt lost 9 lbs in 3 months.  CC: "I'm feeling okay." There are no medical forms/records brought in today. Pt's health aide states that pt is clinically unchanged and the medications are the same.   [Medications verified as per pt on 08/13/2024] Current Medications: Ventolin, Trazadone 50 mg, Lisinopril 10 mg, Flonase 50 mcg, multivitamins. - Levothyroxine 50 mcg (1st dose given " a long time ago"),

## 2024-08-13 NOTE — ADDENDUM
[FreeTextEntry1] : I, Enmanuel You act solely as a scribe for Dr. Rene Clifford on this date 08/13/2024

## 2024-08-13 NOTE — PHYSICAL EXAM
[No Acute Distress] : no acute distress [Normal Sclera/Conjunctiva] : normal sclera/conjunctiva [No Proptosis] : no proptosis [Normal Outer Ear/Nose] : the ears and nose were normal in appearance [Thyroid Not Enlarged] : the thyroid was not enlarged [No Thyroid Nodules] : no palpable thyroid nodules [Clear to Auscultation] : lungs were clear to auscultation bilaterally [Normal Rate] : heart rate was normal [No Edema] : no peripheral edema [Soft] : abdomen soft [Spine Straight] : spine straight [No Stigmata of Cushings Syndrome] : no stigmata of Cushings Syndrome [Normal Gait] : normal gait [Normal Strength/Tone] : muscle strength and tone were normal [No Rash] : no rash [Normal Reflexes] : deep tendon reflexes were 2+ and symmetric [No Tremors] : no tremors [Oriented x3] : oriented to person, place, and time [Kyphosis] : no kyphosis present [Acanthosis Nigricans] : no acanthosis nigricans [de-identified] : Thyroid not palpated. No tenderness.

## 2024-08-13 NOTE — END OF VISIT
[FreeTextEntry3] :  All medical record entries made by the Scribe were at my, Dr. Rene Clifford, direction and personally dictated by me on 08/13/2024. I have reviewed the chart and agree that the record accurately reflects my personal performance of the history, physical exam, assessment and plan. I have also personally directed, reviewed and agreed with the chart. [Time Spent: ___ minutes] : I have spent [unfilled] minutes of time on the encounter.

## 2024-08-21 LAB
T4 FREE SERPL-MCNC: 0.9 NG/DL
THYROGLOB AB SERPL-ACNC: <20 IU/ML
THYROPEROXIDASE AB SERPL IA-ACNC: 82.8 IU/ML
TSH SERPL-ACNC: 6.47 UIU/ML

## 2025-01-04 ENCOUNTER — INPATIENT (INPATIENT)
Facility: HOSPITAL | Age: 80
LOS: 1 days | Discharge: HOME | DRG: 872 | End: 2025-01-06
Attending: STUDENT IN AN ORGANIZED HEALTH CARE EDUCATION/TRAINING PROGRAM | Admitting: STUDENT IN AN ORGANIZED HEALTH CARE EDUCATION/TRAINING PROGRAM
Payer: MEDICARE

## 2025-01-04 VITALS
SYSTOLIC BLOOD PRESSURE: 173 MMHG | RESPIRATION RATE: 22 BRPM | OXYGEN SATURATION: 96 % | TEMPERATURE: 99 F | HEART RATE: 92 BPM | DIASTOLIC BLOOD PRESSURE: 75 MMHG

## 2025-01-04 DIAGNOSIS — N40.0 BENIGN PROSTATIC HYPERPLASIA WITHOUT LOWER URINARY TRACT SYMPTOMS: ICD-10-CM

## 2025-01-04 DIAGNOSIS — Z29.9 ENCOUNTER FOR PROPHYLACTIC MEASURES, UNSPECIFIED: ICD-10-CM

## 2025-01-04 DIAGNOSIS — J10.1 INFLUENZA DUE TO OTHER IDENTIFIED INFLUENZA VIRUS WITH OTHER RESPIRATORY MANIFESTATIONS: ICD-10-CM

## 2025-01-04 DIAGNOSIS — N39.0 URINARY TRACT INFECTION, SITE NOT SPECIFIED: ICD-10-CM

## 2025-01-04 DIAGNOSIS — R53.1 WEAKNESS: ICD-10-CM

## 2025-01-04 DIAGNOSIS — I10 ESSENTIAL (PRIMARY) HYPERTENSION: ICD-10-CM

## 2025-01-04 DIAGNOSIS — E87.1 HYPO-OSMOLALITY AND HYPONATREMIA: ICD-10-CM

## 2025-01-04 DIAGNOSIS — J45.909 UNSPECIFIED ASTHMA, UNCOMPLICATED: ICD-10-CM

## 2025-01-04 DIAGNOSIS — H40.9 UNSPECIFIED GLAUCOMA: ICD-10-CM

## 2025-01-04 DIAGNOSIS — F79 UNSPECIFIED INTELLECTUAL DISABILITIES: ICD-10-CM

## 2025-01-04 LAB
ALBUMIN SERPL ELPH-MCNC: 3.5 G/DL — SIGNIFICANT CHANGE UP (ref 3.3–5)
ALP SERPL-CCNC: 95 U/L — SIGNIFICANT CHANGE UP (ref 40–120)
ALT FLD-CCNC: 9 U/L — LOW (ref 10–45)
ANION GAP SERPL CALC-SCNC: 10 MMOL/L — SIGNIFICANT CHANGE UP (ref 5–17)
ANION GAP SERPL CALC-SCNC: 5 MMOL/L — SIGNIFICANT CHANGE UP (ref 5–17)
APPEARANCE UR: ABNORMAL
APTT BLD: 29.6 SEC — SIGNIFICANT CHANGE UP (ref 24.5–35.6)
AST SERPL-CCNC: 17 U/L — SIGNIFICANT CHANGE UP (ref 10–40)
BASOPHILS # BLD AUTO: 0 K/UL — SIGNIFICANT CHANGE UP (ref 0–0.2)
BASOPHILS NFR BLD AUTO: 0 % — SIGNIFICANT CHANGE UP (ref 0–2)
BILIRUB SERPL-MCNC: 0.5 MG/DL — SIGNIFICANT CHANGE UP (ref 0.2–1.2)
BILIRUB UR-MCNC: NEGATIVE — SIGNIFICANT CHANGE UP
BUN SERPL-MCNC: 15 MG/DL — SIGNIFICANT CHANGE UP (ref 7–23)
BUN SERPL-MCNC: 17 MG/DL — SIGNIFICANT CHANGE UP (ref 7–23)
CALCIUM SERPL-MCNC: 7.3 MG/DL — LOW (ref 8.4–10.5)
CALCIUM SERPL-MCNC: 8.6 MG/DL — SIGNIFICANT CHANGE UP (ref 8.4–10.5)
CHLORIDE SERPL-SCNC: 92 MMOL/L — LOW (ref 96–108)
CHLORIDE SERPL-SCNC: 98 MMOL/L — SIGNIFICANT CHANGE UP (ref 96–108)
CO2 SERPL-SCNC: 24 MMOL/L — SIGNIFICANT CHANGE UP (ref 22–31)
CO2 SERPL-SCNC: 24 MMOL/L — SIGNIFICANT CHANGE UP (ref 22–31)
COLOR SPEC: YELLOW — SIGNIFICANT CHANGE UP
CREAT ?TM UR-MCNC: 26 MG/DL — SIGNIFICANT CHANGE UP
CREAT SERPL-MCNC: 0.7 MG/DL — SIGNIFICANT CHANGE UP (ref 0.5–1.3)
CREAT SERPL-MCNC: 0.8 MG/DL — SIGNIFICANT CHANGE UP (ref 0.5–1.3)
DIFF PNL FLD: ABNORMAL
EGFR: 89 ML/MIN/1.73M2 — SIGNIFICANT CHANGE UP
EGFR: 93 ML/MIN/1.73M2 — SIGNIFICANT CHANGE UP
EOSINOPHIL # BLD AUTO: 0 K/UL — SIGNIFICANT CHANGE UP (ref 0–0.5)
EOSINOPHIL NFR BLD AUTO: 0 % — SIGNIFICANT CHANGE UP (ref 0–6)
FLUAV AG NPH QL: DETECTED
FLUBV AG NPH QL: SIGNIFICANT CHANGE UP
GAS PNL BLDV: SIGNIFICANT CHANGE UP
GLUCOSE SERPL-MCNC: 114 MG/DL — HIGH (ref 70–99)
GLUCOSE SERPL-MCNC: 123 MG/DL — HIGH (ref 70–99)
GLUCOSE UR QL: NEGATIVE MG/DL — SIGNIFICANT CHANGE UP
HCT VFR BLD CALC: 35.1 % — LOW (ref 39–50)
HGB BLD-MCNC: 11.7 G/DL — LOW (ref 13–17)
INR BLD: 1.11 — SIGNIFICANT CHANGE UP (ref 0.85–1.16)
KETONES UR-MCNC: NEGATIVE MG/DL — SIGNIFICANT CHANGE UP
LACTATE SERPL-SCNC: 0.8 MMOL/L — SIGNIFICANT CHANGE UP (ref 0.5–2)
LACTATE SERPL-SCNC: 3.2 MMOL/L — HIGH (ref 0.5–2)
LEUKOCYTE ESTERASE UR-ACNC: ABNORMAL
LYMPHOCYTES # BLD AUTO: 0.33 K/UL — LOW (ref 1–3.3)
LYMPHOCYTES # BLD AUTO: 5.2 % — LOW (ref 13–44)
MCHC RBC-ENTMCNC: 32.9 PG — SIGNIFICANT CHANGE UP (ref 27–34)
MCHC RBC-ENTMCNC: 33.3 G/DL — SIGNIFICANT CHANGE UP (ref 32–36)
MCV RBC AUTO: 98.6 FL — SIGNIFICANT CHANGE UP (ref 80–100)
MONOCYTES # BLD AUTO: 0.16 K/UL — SIGNIFICANT CHANGE UP (ref 0–0.9)
MONOCYTES NFR BLD AUTO: 2.6 % — SIGNIFICANT CHANGE UP (ref 2–14)
NEUTROPHILS # BLD AUTO: 5.67 K/UL — SIGNIFICANT CHANGE UP (ref 1.8–7.4)
NEUTROPHILS NFR BLD AUTO: 90.4 % — HIGH (ref 43–77)
NITRITE UR-MCNC: POSITIVE
OSMOLALITY SERPL: 276 MOSM/KG — LOW (ref 280–301)
OSMOLALITY UR: 209 MOSM/KG — LOW (ref 300–900)
PH UR: 7 — SIGNIFICANT CHANGE UP (ref 5–8)
PLATELET # BLD AUTO: 139 K/UL — LOW (ref 150–400)
POTASSIUM SERPL-MCNC: 3.6 MMOL/L — SIGNIFICANT CHANGE UP (ref 3.5–5.3)
POTASSIUM SERPL-MCNC: 3.8 MMOL/L — SIGNIFICANT CHANGE UP (ref 3.5–5.3)
POTASSIUM SERPL-SCNC: 3.6 MMOL/L — SIGNIFICANT CHANGE UP (ref 3.5–5.3)
POTASSIUM SERPL-SCNC: 3.8 MMOL/L — SIGNIFICANT CHANGE UP (ref 3.5–5.3)
POTASSIUM UR-SCNC: 16 MMOL/L — SIGNIFICANT CHANGE UP
PROT ?TM UR-MCNC: 18 MG/DL — HIGH (ref 0–12)
PROT SERPL-MCNC: 7 G/DL — SIGNIFICANT CHANGE UP (ref 6–8.3)
PROT UR-MCNC: 30 MG/DL
PROT/CREAT UR-RTO: 0.7 RATIO — HIGH (ref 0–0.2)
PROTHROM AB SERPL-ACNC: 12.8 SEC — SIGNIFICANT CHANGE UP (ref 9.9–13.4)
RBC # BLD: 3.56 M/UL — LOW (ref 4.2–5.8)
RBC # FLD: 13.2 % — SIGNIFICANT CHANGE UP (ref 10.3–14.5)
RSV RNA NPH QL NAA+NON-PROBE: SIGNIFICANT CHANGE UP
SARS-COV-2 RNA SPEC QL NAA+PROBE: SIGNIFICANT CHANGE UP
SODIUM SERPL-SCNC: 126 MMOL/L — LOW (ref 135–145)
SODIUM SERPL-SCNC: 127 MMOL/L — LOW (ref 135–145)
SODIUM UR-SCNC: 27 MMOL/L — SIGNIFICANT CHANGE UP
SP GR SPEC: 1.01 — SIGNIFICANT CHANGE UP (ref 1–1.03)
TROPONIN T, HIGH SENSITIVITY RESULT: 36 NG/L — SIGNIFICANT CHANGE UP (ref 0–51)
TROPONIN T, HIGH SENSITIVITY RESULT: 41 NG/L — SIGNIFICANT CHANGE UP (ref 0–51)
UROBILINOGEN FLD QL: 1 MG/DL — SIGNIFICANT CHANGE UP (ref 0.2–1)
UUN UR-MCNC: 253 MG/DL — SIGNIFICANT CHANGE UP
WBC # BLD: 6.27 K/UL — SIGNIFICANT CHANGE UP (ref 3.8–10.5)
WBC # FLD AUTO: 6.27 K/UL — SIGNIFICANT CHANGE UP (ref 3.8–10.5)

## 2025-01-04 PROCEDURE — 71045 X-RAY EXAM CHEST 1 VIEW: CPT | Mod: 26

## 2025-01-04 PROCEDURE — 93010 ELECTROCARDIOGRAM REPORT: CPT

## 2025-01-04 PROCEDURE — 99222 1ST HOSP IP/OBS MODERATE 55: CPT | Mod: GC

## 2025-01-04 PROCEDURE — 99285 EMERGENCY DEPT VISIT HI MDM: CPT

## 2025-01-04 RX ORDER — ACETAMINOPHEN 80 MG/.8ML
975 SOLUTION/ DROPS ORAL ONCE
Refills: 0 | Status: DISCONTINUED | OUTPATIENT
Start: 2025-01-04 | End: 2025-01-04

## 2025-01-04 RX ORDER — HYDRALAZINE HYDROCHLORIDE 10 MG/1
10 TABLET ORAL ONCE
Refills: 0 | Status: COMPLETED | OUTPATIENT
Start: 2025-01-04 | End: 2025-01-04

## 2025-01-04 RX ORDER — LABETALOL HCL 300 MG/1
5 TABLET, FILM COATED ORAL ONCE
Refills: 0 | Status: DISCONTINUED | OUTPATIENT
Start: 2025-01-04 | End: 2025-01-04

## 2025-01-04 RX ORDER — AZITHROMYCIN MONOHYDRATE 200 MG/5ML
500 POWDER, FOR SUSPENSION ORAL ONCE
Refills: 0 | Status: COMPLETED | OUTPATIENT
Start: 2025-01-04 | End: 2025-01-04

## 2025-01-04 RX ORDER — CEFTRIAXONE SODIUM 1 G/1
1000 INJECTION, POWDER, FOR SOLUTION INTRAMUSCULAR; INTRAVENOUS ONCE
Refills: 0 | Status: COMPLETED | OUTPATIENT
Start: 2025-01-04 | End: 2025-01-04

## 2025-01-04 RX ORDER — GINKGO BILOBA 40 MG
3 CAPSULE ORAL AT BEDTIME
Refills: 0 | Status: DISCONTINUED | OUTPATIENT
Start: 2025-01-04 | End: 2025-01-06

## 2025-01-04 RX ORDER — SODIUM CHLORIDE 9 MG/ML
2200 INJECTION, SOLUTION INTRAMUSCULAR; INTRAVENOUS; SUBCUTANEOUS ONCE
Refills: 0 | Status: COMPLETED | OUTPATIENT
Start: 2025-01-04 | End: 2025-01-04

## 2025-01-04 RX ORDER — MAG HYDROX/ALUMINUM HYD/SIMETH 200-200-20
30 SUSPENSION, ORAL (FINAL DOSE FORM) ORAL EVERY 4 HOURS
Refills: 0 | Status: DISCONTINUED | OUTPATIENT
Start: 2025-01-04 | End: 2025-01-06

## 2025-01-04 RX ORDER — ONDANSETRON 4 MG/1
4 TABLET ORAL EVERY 8 HOURS
Refills: 0 | Status: DISCONTINUED | OUTPATIENT
Start: 2025-01-04 | End: 2025-01-06

## 2025-01-04 RX ORDER — MONTELUKAST SODIUM 10 MG/1
10 TABLET, FILM COATED ORAL DAILY
Refills: 0 | Status: DISCONTINUED | OUTPATIENT
Start: 2025-01-04 | End: 2025-01-06

## 2025-01-04 RX ORDER — CEFTRIAXONE SODIUM 1 G/1
1000 INJECTION, POWDER, FOR SOLUTION INTRAMUSCULAR; INTRAVENOUS EVERY 24 HOURS
Refills: 0 | Status: DISCONTINUED | OUTPATIENT
Start: 2025-01-05 | End: 2025-01-06

## 2025-01-04 RX ORDER — FINASTERIDE 5 MG
5 TABLET ORAL DAILY
Refills: 0 | Status: DISCONTINUED | OUTPATIENT
Start: 2025-01-04 | End: 2025-01-06

## 2025-01-04 RX ORDER — IRON/LYS/VIT B COMP/FOLIC ACID 800-1MG/15
1 LIQUID (ML) ORAL DAILY
Refills: 0 | Status: DISCONTINUED | OUTPATIENT
Start: 2025-01-04 | End: 2025-01-06

## 2025-01-04 RX ORDER — LISINOPRIL 30 MG/1
10 TABLET ORAL DAILY
Refills: 0 | Status: DISCONTINUED | OUTPATIENT
Start: 2025-01-04 | End: 2025-01-06

## 2025-01-04 RX ORDER — ALBUTEROL SULFATE 90 UG/1
2 INHALANT RESPIRATORY (INHALATION) EVERY 6 HOURS
Refills: 0 | Status: DISCONTINUED | OUTPATIENT
Start: 2025-01-04 | End: 2025-01-06

## 2025-01-04 RX ORDER — ACETAMINOPHEN 80 MG/.8ML
650 SOLUTION/ DROPS ORAL EVERY 6 HOURS
Refills: 0 | Status: DISCONTINUED | OUTPATIENT
Start: 2025-01-04 | End: 2025-01-06

## 2025-01-04 RX ORDER — TRAZODONE HYDROCHLORIDE 150 MG/1
50 TABLET ORAL DAILY
Refills: 0 | Status: DISCONTINUED | OUTPATIENT
Start: 2025-01-04 | End: 2025-01-06

## 2025-01-04 RX ORDER — TAMSULOSIN HYDROCHLORIDE 0.4 MG/1
0.8 CAPSULE ORAL AT BEDTIME
Refills: 0 | Status: DISCONTINUED | OUTPATIENT
Start: 2025-01-04 | End: 2025-01-06

## 2025-01-04 RX ORDER — FLUTICASONE PROPIONATE 50 UG/1
1 SPRAY, METERED NASAL ONCE
Refills: 0 | Status: DISCONTINUED | OUTPATIENT
Start: 2025-01-04 | End: 2025-01-06

## 2025-01-04 RX ORDER — OSELTAMIVIR 75 MG/1
75 CAPSULE ORAL
Refills: 0 | Status: DISCONTINUED | OUTPATIENT
Start: 2025-01-04 | End: 2025-01-06

## 2025-01-04 RX ORDER — ACETAMINOPHEN 80 MG/.8ML
1000 SOLUTION/ DROPS ORAL ONCE
Refills: 0 | Status: COMPLETED | OUTPATIENT
Start: 2025-01-04 | End: 2025-01-04

## 2025-01-04 RX ORDER — POLYSORBATE 80 100 MG/10ML
1 SOLUTION/ DROPS OPHTHALMIC DAILY
Refills: 0 | Status: DISCONTINUED | OUTPATIENT
Start: 2025-01-04 | End: 2025-01-06

## 2025-01-04 RX ORDER — OSELTAMIVIR 75 MG/1
75 CAPSULE ORAL ONCE
Refills: 0 | Status: COMPLETED | OUTPATIENT
Start: 2025-01-04 | End: 2025-01-04

## 2025-01-04 RX ADMIN — TAMSULOSIN HYDROCHLORIDE 0.8 MILLIGRAM(S): 0.4 CAPSULE ORAL at 21:13

## 2025-01-04 RX ADMIN — ACETAMINOPHEN 400 MILLIGRAM(S): 80 SOLUTION/ DROPS ORAL at 10:33

## 2025-01-04 RX ADMIN — OSELTAMIVIR 75 MILLIGRAM(S): 75 CAPSULE ORAL at 10:58

## 2025-01-04 RX ADMIN — CEFTRIAXONE SODIUM 100 MILLIGRAM(S): 1 INJECTION, POWDER, FOR SOLUTION INTRAMUSCULAR; INTRAVENOUS at 10:33

## 2025-01-04 RX ADMIN — ACETAMINOPHEN 1000 MILLIGRAM(S): 80 SOLUTION/ DROPS ORAL at 11:05

## 2025-01-04 RX ADMIN — OSELTAMIVIR 75 MILLIGRAM(S): 75 CAPSULE ORAL at 21:13

## 2025-01-04 RX ADMIN — AZITHROMYCIN MONOHYDRATE 255 MILLIGRAM(S): 200 POWDER, FOR SUSPENSION ORAL at 10:41

## 2025-01-04 RX ADMIN — SODIUM CHLORIDE 2200 MILLILITER(S): 9 INJECTION, SOLUTION INTRAMUSCULAR; INTRAVENOUS; SUBCUTANEOUS at 10:33

## 2025-01-04 RX ADMIN — LISINOPRIL 10 MILLIGRAM(S): 30 TABLET ORAL at 18:09

## 2025-01-04 NOTE — H&P ADULT - ASSESSMENT
79-year-old male history of intellectual disability, asthma, BPH, glaucoma, hypertension sonal in from his group home for 1 week of worsening weakness, chills and cough. Being treated for hyponatremia and weakness in the setting of influenza a infection.  79-year-old male history of intellectual disability, asthma, BPH, glaucoma, hypertension brought in from his group home for 1 week of worsening weakness, chills and cough. Being treated for hyponatremia and weakness in the setting of influenza a infection.

## 2025-01-04 NOTE — H&P ADULT - NSHPREVIEWOFSYSTEMS_GEN_ALL_CORE
REVIEW OF SYSTEMS:    CONSTITUTIONAL: + weakness, + chills  EYES/ENT: No visual changes;  No vertigo or throat pain, no headache   NECK: No pain or stiffness  RESPIRATORY: + cough, wheezing, hemoptysis; No shortness of breath  CARDIOVASCULAR: No chest pain or palpitations, no lower limb edema  GASTROINTESTINAL: No abdominal or epigastric pain. No nausea, vomiting, or hematemesis; No diarrhea or constipation. No melena or hematochezia.  GENITOURINARY: No dysuria, frequency or hematuria  NEUROLOGICAL: No numbness or weakness  SKIN: No itching, rashes  MUSCOLOSKELETAL: no arthralgia or arthritis

## 2025-01-04 NOTE — ED PROVIDER NOTE - CLINICAL SUMMARY MEDICAL DECISION MAKING FREE TEXT BOX
Pt c/o weakness x 1 wk, cough x months, noted to have fever, other vs wnl.  Lactate also elevated.  ? viral vs bacterial pulm infection. Pt given empiric iv abx for cap based on cc and abnl lactate, rpt lactate planned.  Labs also notable for nl cbc, chem w na 126, nl vbg, flu/covid w + flu, cxr neg.  Plan rpt lactate and chem after ivf; reassess. See progress notes for further mdm related documentation.

## 2025-01-04 NOTE — ED PROVIDER NOTE - CARE PLAN
1 Principal Discharge DX:	Influenza A   Principal Discharge DX:	Influenza A  Secondary Diagnosis:	Hyponatremia

## 2025-01-04 NOTE — ED ADULT NURSE NOTE - OBJECTIVE STATEMENT
Patient presents to the ED from a group home. As per the person accompanying patient, patient has had a cough for the past few months. Patient has been feeling weak for the past few days with increased coughing. History of asthma,

## 2025-01-04 NOTE — H&P ADULT - PROBLEM SELECTOR PLAN 7
lives in group home. A&Ox3 however does not always respond to questions. Will also state his is ok but change answers. Per aid at bedside this is patient's baseline   Home medications: Trazodone 50mg daily, Trifluoperazine 5mg   Plan   - continue home medications intially hypertensive on arrival with systolic BP > 170, however repeat BP of 151 systolic   Home medication: lisinopril 10mg     Plan   - can restart home medication for BP > 180

## 2025-01-04 NOTE — ED PROVIDER NOTE - PROGRESS NOTE DETAILS
PAIN/ITCHING/RASH Rpt chem w/o sig change in na; urine/serum studies added on.  Prior na 133-4 in 2023.  Rpt lactate improved.  VS improved.  Trop downtrending.

## 2025-01-04 NOTE — H&P ADULT - PROBLEM SELECTOR PLAN 2
Na: 127 on admission. Urine OSM: Na: 127 on admission. Urine OSM: 209, Favian: 27, Urea: 253,   Likely hypovolemic hyponatremia given 1 week of decreased Po intake and low urine sodium and urea leading to reabsorption of fluids via RAAS.   s/p 2200 ml in ED     Plan   - f/u repeat sodium in am   - f/u repeat Urine Osums in am

## 2025-01-04 NOTE — ED ADULT NURSE NOTE - NS ED NURSE LEVEL OF CONSCIOUSNESS AFFECT
yes    \"If your health worsens and it becomes clear that your chance of recovery is unlikely, what would your preference be about the use of a ventilator (breathing machine) if it were available to you? \"     Would the patient desire the use of ventilator (breathing machine)?: No      Resuscitation  \"CPR works best to restart the heart when there is a sudden event, like a heart attack, in someone who is otherwise healthy. Unfortunately, CPR does not typically restart the heart for people who have serious health conditions or who are very sick. \"    \"In the event your heart stopped as a result of an underlying serious health condition, would you want attempts to be made to restart your heart (answer \"yes\" for attempt to resuscitate) or would you prefer a natural death (answer \"no\" for do not attempt to resuscitate)? \" yes      NOTE: If the patient has a valid advance directive AND now provides care preference(s) that are inconsistent with that prior directive, advise the patient to consider either: creating a new advance directive that complies with state-specific requirements; or, if that is not possible, orally revoking that prior directive in accordance with state-specific requirements, which must be documented in the EHR. [] Yes   [] No   Educated Patient / Honorio Gutierrez regarding differences between Advance Directives and portable DNR orders.     Length of ACP Conversation in minutes:      Conversation Outcomes:  [x] ACP discussion completed  [] Existing advance directive reviewed with patient; no changes to patient's previously recorded wishes  [] New Advance Directive completed  [] Portable Do Not Rescitate prepared for Provider review and signature  [] POLST/POST/MOLST/MOST prepared for Provider review and signature      Follow-up plan:    [] Schedule follow-up conversation to continue planning  [] Referred individual to Provider for additional questions/concerns   [] Advised patient/agent/surrogate to
Calm/Appropriate

## 2025-01-04 NOTE — H&P ADULT - NSICDXPASTMEDICALHX_GEN_ALL_CORE_FT
PAST MEDICAL HISTORY:  Asthma     BPH (benign prostatic hyperplasia)     Glaucoma     History of developmental disability     Hypertension

## 2025-01-04 NOTE — ED ADULT NURSE NOTE - NSFALLHARMRISKINTERV_ED_ALL_ED

## 2025-01-04 NOTE — H&P ADULT - PROBLEM SELECTOR PLAN 9
Plan:  F: s/p 2L NS in the ED   E: replete K<4, Mg<2  N: regular  VTE Prophylaxis: Lovenox   GI: home pepcid  C: Full Code  D: TAMIR likely secondary to decreased po intake     Plan   - PT/OT consult   - treat infection as above

## 2025-01-04 NOTE — H&P ADULT - ATTENDING COMMENTS
79-year-old male history of intellectual disability, asthma, BPH, glaucoma, hypertension brought in from his group home for 1 week of worsening weakness, chills and cough. Being treated for hyponatremia and weakness in the setting of influenza a infection.    Tx for flu A +supportive care. IVF for hypoNa d/t poor po intake. Tx for UTi as above. anticipate return to group home once returned to baseline.

## 2025-01-04 NOTE — H&P ADULT - PROBLEM SELECTOR PLAN 1
Presenting with over one month of flu like symptoms, per aid, now with worsening productive cough as well as increased weakness. Tmax of 102 in ed. Now s/p Azithromycin 500mg IV x 1, CTX 1g x 1, Tamiflu 75mg x 1 in the ED.   -       Plan   - continue with Tamiflu 75mg QD  - Presenting with over one month of flu like symptoms, per aid, now with worsening productive cough as well as increased weakness. Tmax of 102 in ed. Now s/p Azithromycin 500mg IV x 1, CTX 1g x 1, Tamiflu 75mg x 1 in the ED.   -       Plan   - continue with Tamiflu 75mg QD  - Tylenol as needed for pain   - Encourage PO fluids Presenting with over one month of flu like symptoms, per aid, now with worsening productive cough as well as increased weakness. Tmax of 102 in ed. Now s/p Azithromycin 500mg IV x 1, CTX 1g x 1, Tamiflu 75mg x 1 in the ED.         Plan   - continue with Tamiflu 75mg QD  - Tylenol as needed for pain   - Encourage PO fluids

## 2025-01-04 NOTE — H&P ADULT - PROBLEM SELECTOR PLAN 8
UA+ for nitrites UA+ for nitrites and WBC. S/p CTX 1g in ED. Patient w/o any complaints at this time but does not always verbailze discomfort.     Plan   - Continue with CTX 1g x 5 days (1/4-1/8) lives in group home. A&Ox3 however does not always respond to questions. Will also state his is ok but change answers. Per aid at bedside this is patient's baseline   Home medications: Trazodone 50mg daily, Trifluoperazine 5mg   Plan   - continue home medications

## 2025-01-04 NOTE — H&P ADULT - NSHPLABSRESULTS_GEN_ALL_CORE
LABS:                          11.7   6.27  )-----------( 139      ( 04 Jan 2025 09:31 )             35.1     01-04    127[L]  |  98  |  15  ----------------------------<  123[H]  3.6   |  24  |  0.70    Ca    7.3[L]      04 Jan 2025 12:10    TPro  7.0  /  Alb  3.5  /  TBili  0.5  /  DBili  x   /  AST  17  /  ALT  9[L]  /  AlkPhos  95  01-04    LIVER FUNCTIONS - ( 04 Jan 2025 09:31 )  Alb: 3.5 g/dL / Pro: 7.0 g/dL / ALK PHOS: 95 U/L / ALT: 9 U/L / AST: 17 U/L / GGT: x           PT/INR - ( 04 Jan 2025 09:31 )   PT: 12.8 sec;   INR: 1.11          PTT - ( 04 Jan 2025 09:31 )  PTT:29.6 sec  Urinalysis Basic - ( 04 Jan 2025 12:10 )    Color: x / Appearance: x / SG: x / pH: x  Gluc: 123 mg/dL / Ketone: x  / Bili: x / Urobili: x   Blood: x / Protein: x / Nitrite: x   Leuk Esterase: x / RBC: x / WBC x   Sq Epi: x / Non Sq Epi: x / Bacteria: x

## 2025-01-04 NOTE — H&P ADULT - PROBLEM SELECTOR PLAN 5
home medications: systane opthalmic solution   plan   - continue home medications no wheezing noted on exam   home medications: Albuterol 90mcg q6 prn, montelukast 10mg oral tabs daily   Saturating 96% on room air, no increased work of breathing on exam   CXR w/o any acute findings     Plan   - continue with home medications   - Supplemental O2 as needed

## 2025-01-04 NOTE — H&P ADULT - NSHPPHYSICALEXAM_GEN_ALL_CORE
.  VITAL SIGNS:  T(F): 97.5 (01-04-25 @ 14:26), Max: 102 (01-04-25 @ 09:34)  HR: 87 (01-04-25 @ 14:26) (75 - 92)  BP: 154/73 (01-04-25 @ 14:26) (122/60 - 173/75)  BP(mean): --  RR: 17 (01-04-25 @ 14:26) (17 - 22)  SpO2: 97% (01-04-25 @ 14:26) (96% - 97%)    PHYSICAL EXAM:    Constitutional: resting comfortably in bed; NAD  HEENT: NC/AT, PERRL, EOMI, anicteric sclera, no nasal discharge; uvula midline, no oropharyngeal erythema or exudates; MMM  Neck: supple; no JVD or thyromegaly  Respiratory: unlabored breathing, CTA B/L; no W/Rhonchi/Crackles, no retractions or use of accessory muscles   Cardiac: +S1/S2; RRR; no M/R/G; No ventricular heaves, PMI non-displaced  Gastrointestinal: soft, NT/ND; no rebound or guarding; +BSx4  Genitourinary: normal external genitalia  Back: spine midline, no bony tenderness or step-offs; no CVAT B/L  Extremities: WWP, no clubbing or cyanosis; no peripheral edema  Musculoskeletal: NROM x4; no joint swelling, tenderness or erythema  Vascular: 2+ radial, DP/PT pulses B/L  Dermatologic: skin warm, dry and intact; no rashes, wounds, or scars  Lymphatic: no submandibular or cervical LAD  Neurologic: AAOx3; CNII-XII grossly intact; no focal deficits  Psychiatric: affect and characteristics of appearance, verbalizations, behaviors are appropriate, denies SI/HI/AH/VH

## 2025-01-04 NOTE — PATIENT PROFILE ADULT - FALL HARM RISK - RISK INTERVENTIONS
Assistance OOB with selected safe patient handling equipment/Assistance with ambulation/Communicate Fall Risk and Risk Factors to all staff, patient, and family/Discuss with provider need for PT consult/Monitor gait and stability/Reinforce activity limits and safety measures with patient and family/Visual Cue: Yellow wristband/Bed in lowest position, wheels locked, appropriate side rails in place/Call bell, personal items and telephone in reach/Instruct patient to call for assistance before getting out of bed or chair/Non-slip footwear when patient is out of bed/Belle to call system/Physically safe environment - no spills, clutter or unnecessary equipment/Purposeful Proactive Rounding/Room/bathroom lighting operational, light cord in reach

## 2025-01-04 NOTE — H&P ADULT - PROBLEM SELECTOR PLAN 10
Plan:  F: s/p 2L NS in the ED   E: replete K<4, Mg<2  N: regular  VTE Prophylaxis: Lovenox   GI: home pepcid  C: Full Code  D: TAMIR

## 2025-01-04 NOTE — ED PROVIDER NOTE - OBJECTIVE STATEMENT
79-year-old male history of intellectual disability, asthma, BPH, glaucoma, hypertension  sent from his group home because of increasing weakness over the past week.  Group home aide reports patient with cough since October with multiple urgent care visits diagnosing him with the viral infection and sending him back to the group home with supportive medications.  The home aide noted that for the past week he has been weaker, stumbling with walking at times and today did not eat very much which is unusual for him he also seem to be having difficulty lifting up the cutlery and glass because of generalized weakness.  Patient denies chest pain, difficulty breathing, abdominal pain, nausea, vomiting, diarrhea, sweats/chills.

## 2025-01-04 NOTE — ED ADULT NURSE NOTE - TEMPLATE LIST FOR HEAD TO TOE ASSESSMENT
Anesthetic History          Comments: Significant hypotension after general anesthesia per patient. No records available. These all seem to have been related to EP procedures, one of which there concern for perforation/tamponade. Review of Systems / Medical History  Patient summary reviewed and pertinent labs reviewed    Pulmonary              Pertinent negatives: Smoker: Former 40 pack year smoker. Quit in 2010. Neuro/Psych   Within defined limits           Cardiovascular    Hypertension: well controlled        Dysrhythmias (AFib, s/p ablation in 2002 and 2004)   CAD and cardiac stents (x1 in 2012. On Eliquis and ASA.)    Exercise tolerance: >4 METS  Comments: Endorses HERNANDEZ recently with what he perceives as increasing AFib burden. GI/Hepatic/Renal  Within defined limits              Endo/Other  Within defined limits           Other Findings              Physical Exam    Airway  Mallampati: II  TM Distance: 4 - 6 cm  Neck ROM: normal range of motion   Mouth opening: Normal     Cardiovascular  Regular rate and rhythm,  S1 and S2 normal,  no murmur, click, rub, or gallop             Dental  No notable dental hx       Pulmonary  Breath sounds clear to auscultation               Abdominal  GI exam deferred       Other Findings            Anesthetic Plan    ASA: 3  Anesthesia type: general          Induction: Intravenous  Anesthetic plan and risks discussed with: Patient and Spouse      Held Eliquis x 48 hrs. Continued bASA. General

## 2025-01-04 NOTE — H&P ADULT - PROBLEM SELECTOR PLAN 3
home medications: finasteride 5mg, Tamsulosin 0.4mg BID. Usually requires use of purwick at facility     Plan   - continue with finasteride 5mg and Tamsulosin 0.8mg (0.4mg x 2)   - parr placed in ED to obtain urine sample, can consider trial of void in am UA+ for nitrites and WBC. S/p CTX 1g in ED. Patient w/o any complaints at this time but does not always verbalize discomfort.     Plan   - Continue with CTX 1g x 5 days (1/4-1/8) UA+ for nitrites and WBC. S/p CTX 1g in ED. Patient w/o any complaints at this time but does not always verbalize discomfort.     Plan   - Continue with CTX 1g x 3 days (1/4-1/6)

## 2025-01-04 NOTE — ED ADULT NURSE NOTE - CHIEF COMPLAINT QUOTE
Pt BIBEMS c/o cough x months and sob x days. Pt stated "I feel fine", but presented with wet cough and tachypneic on arrival. Pt arrived with a staff member from his group home. Emiliano avitia, n/v/d, f/c. As per staff member, pt was eating this morning and suddenly dropped everything and appeared very weak. hx of asthma

## 2025-01-04 NOTE — H&P ADULT - HISTORY OF PRESENT ILLNESS
79-year-old male history of intellectual disability, asthma, BPH, glaucoma, hypertension sonal in from his group home for 1 week of worsening weakness, chills and cough.     Per aid from group home, in october patient developed a cough, runny nose, and intermittent chills. Since then he has had repeated trips to urgent care where he was told he had a cold and was sent back home with supportive therapy. During the past week the aid has noticed that he has become progressively weaker. Normally he is first to get out of bed but has been getting up later than usual. Also walking less often than before and then today  seem to be having difficulty lifting up the cutlery and glass because of generalized weakness.  Per aid patient has also been eating and drinking less than usual this past week. Yesterday she noticed his cough had developed from an intermittent dry cough to a productive cough, although she could not recall the color of the sputum. Normally has a good appetite but over the past few days has not been finishing his meals.       Patient denies chest pain, difficulty breathing, abdominal pain, nausea, vomiting, diarrhea, sweats/chills.       ED Course   Vitals T: 102, BP: 173/75, RR: 22, 96% on RA  Hgb: 11.7, Hct: 35.1, WBC: 6.27 Neutrophil: 90.4%  Na: 127, K: 3.6, Cl: 96, CO2: 24, Trop: 36, Lactate 3.2 ----> 0.8   Imaging: CXR No acute findings   RVP: + Flu   Interventions: Azithromycin 500mg IV x 1, CTX 1g x 1, Tamiflu 75mg x 1, NaCl 2200ml IV bolus, BCx drawn, parr placed

## 2025-01-04 NOTE — H&P ADULT - TIME BILLING
Time-based billing (NON-critical care).     56 minutes spent on total encounter; more than 50% of the visit was spent counseling and / or coordinating care by the attending physician.  The necessity of the time spent during the encounter on this date of service was due to:     review of laboratory data, radiology results, consultants' recommendations, documentation in [place], discussion with patient/caregivers/housestaff and interdisciplinary staff (such as , social workers, etc). Interventions were performed as documented above.

## 2025-01-04 NOTE — ED ADULT NURSE NOTE - ISAR MEDICATION
Bedside shift change report given to Parris Solitario RN and Ney Marquez RN (oncoming nurse) by Cara Reyes RN (offgoing nurse). Report included the following information SBAR, Kardex, ED Summary, Intake/Output, MAR, Accordion, Recent Results and Cardiac Rhythm NSR. No

## 2025-01-04 NOTE — H&P ADULT - PROBLEM SELECTOR PLAN 4
no wheezing noted on exam   home medications: Albuterol 90mcg q6 prn, montelukast 10mg oral tabs daily   Saturating 96% on room air, no increased work of breathing on exam   CXR w/o any acute findings     Plan   - continue with home medications   - Supplemental O2 as needed home medications: finasteride 5mg, Tamsulosin 0.4mg BID. Usually requires use of purwick at facility     Plan   - continue with finasteride 5mg and Tamsulosin 0.8mg (0.4mg x 2)   - parr placed in ED to obtain urine sample, can consider trial of void in am

## 2025-01-04 NOTE — ED ADULT TRIAGE NOTE - CHIEF COMPLAINT QUOTE
Pt BIBEMS c/o cough and sob x days. Pt stated "I feel fine", but presented with wet cough and tachypneic on arrival. Pt arrived with a staff member from his group home. Emiliano osullivan cp, n/v/d, f/c. As per staff member, pt was eating this morning and suddenly dropped everything and appeared sob. hx of asthma Pt BIBEMS c/o cough x months and sob x days. Pt stated "I feel fine", but presented with wet cough and tachypneic on arrival. Pt arrived with a staff member from his group home. Emiliano avitia, n/v/d, f/c. As per staff member, pt was eating this morning and suddenly dropped everything and appeared very weak. hx of asthma

## 2025-01-04 NOTE — H&P ADULT - PROBLEM SELECTOR PLAN 6
intially hypertensive on arrival with systolic BP > 170, however repeat BP of 151 systolic   Home medication: lisinopril 10mg     Plan   - can restart home medication for BP > 180 home medications: systane opthalmic solution   plan   - continue home medications

## 2025-01-04 NOTE — ED PROVIDER NOTE - PHYSICAL EXAMINATION
VITAL SIGNS: I have reviewed nursing notes and confirm.  CONSTITUTIONAL:  in no acute distress.   SKIN:  warm and dry, no acute rash.   HEAD:  normocephalic, atraumatic.  EYES: PERRL, EOM intact; conjunctiva and sclera clear.  ENT: No nasal discharge; airway clear.   NECK: Supple; non tender.  CARD: S1, S2 normal; no murmurs, gallops, or rubs. Regular rate and rhythm.   RESP:  Clear to auscultation b/l, decreased bs L compared to R, no wheezes, rales or rhonchi.  ABD: Normal bowel sounds; soft; non-distended; non-tender; no guarding/ rebound.  MSK: Normal ROM. No clubbing, cyanosis or edema. no ttp bilat le  NEURO: Alert, oriented, grossly unremarkable  PSYCH: Cooperative, mood and affect appropriate.

## 2025-01-05 LAB
ADD ON TEST-SPECIMEN IN LAB: SIGNIFICANT CHANGE UP
ANION GAP SERPL CALC-SCNC: 9 MMOL/L — SIGNIFICANT CHANGE UP (ref 5–17)
BASOPHILS # BLD AUTO: 0.01 K/UL — SIGNIFICANT CHANGE UP (ref 0–0.2)
BASOPHILS NFR BLD AUTO: 0.2 % — SIGNIFICANT CHANGE UP (ref 0–2)
BUN SERPL-MCNC: 14 MG/DL — SIGNIFICANT CHANGE UP (ref 7–23)
CALCIUM SERPL-MCNC: 8 MG/DL — LOW (ref 8.4–10.5)
CHLORIDE SERPL-SCNC: 97 MMOL/L — SIGNIFICANT CHANGE UP (ref 96–108)
CO2 SERPL-SCNC: 25 MMOL/L — SIGNIFICANT CHANGE UP (ref 22–31)
CREAT SERPL-MCNC: 0.74 MG/DL — SIGNIFICANT CHANGE UP (ref 0.5–1.3)
CULTURE RESULTS: SIGNIFICANT CHANGE UP
EGFR: 92 ML/MIN/1.73M2 — SIGNIFICANT CHANGE UP
EOSINOPHIL # BLD AUTO: 0 K/UL — SIGNIFICANT CHANGE UP (ref 0–0.5)
EOSINOPHIL NFR BLD AUTO: 0 % — SIGNIFICANT CHANGE UP (ref 0–6)
GLUCOSE SERPL-MCNC: 94 MG/DL — SIGNIFICANT CHANGE UP (ref 70–99)
HCT VFR BLD CALC: 35.7 % — LOW (ref 39–50)
HGB BLD-MCNC: 11.6 G/DL — LOW (ref 13–17)
IMM GRANULOCYTES NFR BLD AUTO: 0.6 % — SIGNIFICANT CHANGE UP (ref 0–0.9)
LYMPHOCYTES # BLD AUTO: 0.85 K/UL — LOW (ref 1–3.3)
LYMPHOCYTES # BLD AUTO: 15.7 % — SIGNIFICANT CHANGE UP (ref 13–44)
MCHC RBC-ENTMCNC: 32.1 PG — SIGNIFICANT CHANGE UP (ref 27–34)
MCHC RBC-ENTMCNC: 32.5 G/DL — SIGNIFICANT CHANGE UP (ref 32–36)
MCV RBC AUTO: 98.9 FL — SIGNIFICANT CHANGE UP (ref 80–100)
MONOCYTES # BLD AUTO: 0.58 K/UL — SIGNIFICANT CHANGE UP (ref 0–0.9)
MONOCYTES NFR BLD AUTO: 10.7 % — SIGNIFICANT CHANGE UP (ref 2–14)
NEUTROPHILS # BLD AUTO: 3.94 K/UL — SIGNIFICANT CHANGE UP (ref 1.8–7.4)
NEUTROPHILS NFR BLD AUTO: 72.8 % — SIGNIFICANT CHANGE UP (ref 43–77)
NRBC # BLD: 0 /100 WBCS — SIGNIFICANT CHANGE UP (ref 0–0)
PLATELET # BLD AUTO: 124 K/UL — LOW (ref 150–400)
POTASSIUM SERPL-MCNC: 3.6 MMOL/L — SIGNIFICANT CHANGE UP (ref 3.5–5.3)
POTASSIUM SERPL-SCNC: 3.6 MMOL/L — SIGNIFICANT CHANGE UP (ref 3.5–5.3)
RBC # BLD: 3.61 M/UL — LOW (ref 4.2–5.8)
RBC # FLD: 13.2 % — SIGNIFICANT CHANGE UP (ref 10.3–14.5)
SODIUM SERPL-SCNC: 131 MMOL/L — LOW (ref 135–145)
SPECIMEN SOURCE: SIGNIFICANT CHANGE UP
WBC # BLD: 5.41 K/UL — SIGNIFICANT CHANGE UP (ref 3.8–10.5)
WBC # FLD AUTO: 5.41 K/UL — SIGNIFICANT CHANGE UP (ref 3.8–10.5)

## 2025-01-05 PROCEDURE — 99233 SBSQ HOSP IP/OBS HIGH 50: CPT

## 2025-01-05 RX ORDER — POTASSIUM CHLORIDE 600 MG/1
40 TABLET, FILM COATED, EXTENDED RELEASE ORAL ONCE
Refills: 0 | Status: COMPLETED | OUTPATIENT
Start: 2025-01-05 | End: 2025-01-05

## 2025-01-05 RX ORDER — ENOXAPARIN SODIUM 60 MG/.6ML
40 INJECTION INTRAVENOUS; SUBCUTANEOUS EVERY 24 HOURS
Refills: 0 | Status: DISCONTINUED | OUTPATIENT
Start: 2025-01-05 | End: 2025-01-06

## 2025-01-05 RX ADMIN — LISINOPRIL 10 MILLIGRAM(S): 30 TABLET ORAL at 12:40

## 2025-01-05 RX ADMIN — ENOXAPARIN SODIUM 40 MILLIGRAM(S): 60 INJECTION INTRAVENOUS; SUBCUTANEOUS at 17:59

## 2025-01-05 RX ADMIN — CEFTRIAXONE SODIUM 100 MILLIGRAM(S): 1 INJECTION, POWDER, FOR SOLUTION INTRAMUSCULAR; INTRAVENOUS at 10:27

## 2025-01-05 RX ADMIN — Medication 5 MILLIGRAM(S): at 12:40

## 2025-01-05 RX ADMIN — OSELTAMIVIR 75 MILLIGRAM(S): 75 CAPSULE ORAL at 21:35

## 2025-01-05 RX ADMIN — POTASSIUM CHLORIDE 40 MILLIEQUIVALENT(S): 600 TABLET, FILM COATED, EXTENDED RELEASE ORAL at 11:22

## 2025-01-05 RX ADMIN — TAMSULOSIN HYDROCHLORIDE 0.8 MILLIGRAM(S): 0.4 CAPSULE ORAL at 21:35

## 2025-01-05 RX ADMIN — ACETAMINOPHEN 650 MILLIGRAM(S): 80 SOLUTION/ DROPS ORAL at 00:48

## 2025-01-05 RX ADMIN — MONTELUKAST SODIUM 10 MILLIGRAM(S): 10 TABLET, FILM COATED ORAL at 12:41

## 2025-01-05 RX ADMIN — TRAZODONE HYDROCHLORIDE 50 MILLIGRAM(S): 150 TABLET ORAL at 12:40

## 2025-01-05 RX ADMIN — ACETAMINOPHEN 650 MILLIGRAM(S): 80 SOLUTION/ DROPS ORAL at 01:48

## 2025-01-05 RX ADMIN — Medication 1 TABLET(S): at 12:40

## 2025-01-05 RX ADMIN — OSELTAMIVIR 75 MILLIGRAM(S): 75 CAPSULE ORAL at 09:53

## 2025-01-05 RX ADMIN — POLYSORBATE 80 1 DROP(S): 100 SOLUTION/ DROPS OPHTHALMIC at 12:40

## 2025-01-05 NOTE — PHYSICAL THERAPY INITIAL EVALUATION ADULT - ADDITIONAL COMMENTS
Pt unable to offer social history at this time. Per chart, living in group home with home health aide assist. Ambulatory. Pt reports does not use device and can ambulate unassisted, to confirm.

## 2025-01-05 NOTE — PROGRESS NOTE ADULT - PROBLEM SELECTOR PLAN 4
home medications: finasteride 5mg, Tamsulosin 0.4mg BID. Usually requires use of purwick at facility     Plan   - continue with finasteride 5mg and Tamsulosin 0.8mg (0.4mg x 2)   - parr placed in ED to obtain urine sample, can consider trial of void in am home medications: finasteride 5mg, Tamsulosin 0.4mg BID. Usually requires use of purwick at facility     Plan   - continue with finasteride 5mg and Tamsulosin 0.8mg (0.4mg x 2)   - parr placed in ED to obtain urine sample, can consider trial of void

## 2025-01-05 NOTE — PROGRESS NOTE ADULT - SUBJECTIVE AND OBJECTIVE BOX
*****INCOMPLETE NOTE*****    INTERVAL HPI/OVERNIGHT EVENTS:    SUBJECTIVE: Patient seen and examined at bedside, resting comfortably in bed, and does not appear to be in any acute distress. Patient reports    Vital Signs Last 24 Hrs  T(C): 37.1 (05 Jan 2025 05:40), Max: 38.9 (04 Jan 2025 09:34)  T(F): 98.7 (05 Jan 2025 05:40), Max: 102 (04 Jan 2025 09:34)  HR: 81 (05 Jan 2025 00:45) (75 - 92)  BP: 122/56 (05 Jan 2025 06:15) (119/50 - 207/101)  BP(mean): 78 (05 Jan 2025 06:15) (78 - 78)  RR: 18 (05 Jan 2025 05:40) (17 - 22)  SpO2: 95% (05 Jan 2025 05:40) (93% - 97%)    Parameters below as of 05 Jan 2025 05:40  Patient On (Oxygen Delivery Method): room air        PHYSICAL EXAM:  General: in no acute distress  Eyes: EOMI intact bilaterally. Anicteric sclerae, moist conjunctivae  HENT: Moist mucous membranes  Neck: Trachea midline, supple  Lungs: CTA B/L. No wheezes, rales, or rhonchi  Cardiovascular: RRR. No murmurs, rubs, or gallops  Abdomen: Soft, non-tender non-distended; No rebound or guarding  Extremities: WWP, No clubbing, cyanosis or edema  Neurological: Alert and oriented  Skin: Warm and dry. No obvious rash     LABS:                        11.6   5.41  )-----------( 124      ( 05 Jan 2025 05:30 )             35.7     01-04    127[L]  |  98  |  15  ----------------------------<  123[H]  3.6   |  24  |  0.70    Ca    7.3[L]      04 Jan 2025 12:10    TPro  7.0  /  Alb  3.5  /  TBili  0.5  /  DBili  x   /  AST  17  /  ALT  9[L]  /  AlkPhos  95  01-04   INTERVAL HPI/OVERNIGHT EVENTS: YA o/n    SUBJECTIVE: Patient seen and examined at bedside, resting comfortably in bed, and does not appear to be in any acute distress. Patient denies cough, sore throat, shortness of breath, chest pain, abdominal pain, suprapubic tenderness, urinary urgency, frequency or foul odor.     Vital Signs Last 24 Hrs  T(C): 37.1 (05 Jan 2025 05:40), Max: 38.9 (04 Jan 2025 09:34)  T(F): 98.7 (05 Jan 2025 05:40), Max: 102 (04 Jan 2025 09:34)  HR: 81 (05 Jan 2025 00:45) (75 - 92)  BP: 122/56 (05 Jan 2025 06:15) (119/50 - 207/101)  BP(mean): 78 (05 Jan 2025 06:15) (78 - 78)  RR: 18 (05 Jan 2025 05:40) (17 - 22)  SpO2: 95% (05 Jan 2025 05:40) (93% - 97%)    Parameters below as of 05 Jan 2025 05:40  Patient On (Oxygen Delivery Method): room air        PHYSICAL EXAM:  General: in no acute distress  Eyes: EOMI intact bilaterally. Anicteric sclerae, moist conjunctivae  HENT: Dry tongue  Neck: Trachea midline, supple  Lungs: CTA B/L. No wheezes, rales, or rhonchi  Cardiovascular: RRR. No murmurs, rubs, or gallops  Abdomen: Soft, non-tender non-distended; No rebound or guarding  Extremities: WWP, No clubbing, cyanosis or edema  Neurological: Alert and oriented to person, place, month, and year  Skin: Warm and dry. No obvious rash     LABS:                        11.6   5.41  )-----------( 124      ( 05 Jan 2025 05:30 )             35.7     01-04    127[L]  |  98  |  15  ----------------------------<  123[H]  3.6   |  24  |  0.70    Ca    7.3[L]      04 Jan 2025 12:10    TPro  7.0  /  Alb  3.5  /  TBili  0.5  /  DBili  x   /  AST  17  /  ALT  9[L]  /  AlkPhos  95  01-04

## 2025-01-05 NOTE — PROGRESS NOTE ADULT - PROBLEM SELECTOR PLAN 3
UA+ for nitrites and WBC. S/p CTX 1g in ED. Patient w/o any complaints at this time but does not always verbalize discomfort.     Plan   - Continue with CTX 1g x 3 days (1/4-1/6) Na: 127 on admission. Urine OSM: 209, Favian: 27, Urea: 253,   Likely hypovolemic hyponatremia given 1 week of decreased Po intake and low urine sodium and urea leading to reabsorption of fluids via RAAS, however unclear if urine studies obtain before or after fluid resuscitation   s/p 2200 ml in ED     Plan   - repeat sodium in   - continue to encourage PO intake

## 2025-01-05 NOTE — PHYSICAL THERAPY INITIAL EVALUATION ADULT - GENERAL OBSERVATIONS, REHAB EVAL
Spoke to FERN Rose, pt cleared for PT. Admitted and found to have Flu A. Pt rcvd semi supine, +droplet, +bedalarm, +parr, +heplock. Pt alert, A&Ox3, agreeable to PT, hard of hearing. Tolerated session fairly, limited by copious BMs. Demo CG bed mob, Vernell txf, maintaining static stance ~4 min with CG at RW. Pt left semi supine, with RN present for cleaning, +callbell.

## 2025-01-05 NOTE — PHYSICAL THERAPY INITIAL EVALUATION ADULT - IMPAIRED TRANSFERS: SIT/STAND, REHAB EVAL
I will check her iron levels today.  Orders:    CBC Auto Differential    Iron Profile    Ferritin     impaired balance/cognition/impaired postural control/decreased ROM/decreased strength

## 2025-01-05 NOTE — PROGRESS NOTE ADULT - PROBLEM SELECTOR PLAN 1
Presenting with over one month of flu like symptoms, per aid, now with worsening productive cough as well as increased weakness. Tmax of 102 in ed. Now s/p Azithromycin 500mg IV x 1, CTX 1g x 1, Tamiflu 75mg x 1 in the ED.         Plan   - continue with Tamiflu 75mg QD  - Tylenol as needed for pain   - Encourage PO fluids Met sepsis criteria on admission (fever, tachypnea)   UA+ for nitrites and WBC. S/p CTX 1g in ED. Patient w/o any complaints at this time but does not always verbalize discomfort.     Plan   - Continue with CTX 1g x 3 days (1/4-1/6)

## 2025-01-05 NOTE — PROGRESS NOTE ADULT - PROBLEM SELECTOR PLAN 2
Na: 127 on admission. Urine OSM: 209, Favian: 27, Urea: 253,   Likely hypovolemic hyponatremia given 1 week of decreased Po intake and low urine sodium and urea leading to reabsorption of fluids via RAAS.   s/p 2200 ml in ED     Plan   - f/u repeat sodium in am   - f/u repeat Urine Osums in am Presenting with over one month of flu like symptoms, per aid, now with worsening productive cough as well as increased weakness. Currently denies URI symptoms, however unclear if pt is always able to verbalize symptoms. Possibly persistently positive test post-infection. Tmax of 102 in ed. Now s/p Azithromycin 500mg IV x 1, CTX 1g x 1, Tamiflu 75mg x 1 in the ED.       Plan   - continue with Tamiflu 75mg QD x 5 days  - Tylenol as needed for pain   - Encourage PO fluids

## 2025-01-06 ENCOUNTER — TRANSCRIPTION ENCOUNTER (OUTPATIENT)
Age: 80
End: 2025-01-06

## 2025-01-06 VITALS
TEMPERATURE: 97 F | OXYGEN SATURATION: 95 % | HEART RATE: 68 BPM | DIASTOLIC BLOOD PRESSURE: 61 MMHG | SYSTOLIC BLOOD PRESSURE: 107 MMHG | RESPIRATION RATE: 18 BRPM

## 2025-01-06 LAB
ALBUMIN SERPL ELPH-MCNC: 3 G/DL — LOW (ref 3.3–5)
ALP SERPL-CCNC: 68 U/L — SIGNIFICANT CHANGE UP (ref 40–120)
ALT FLD-CCNC: 13 U/L — SIGNIFICANT CHANGE UP (ref 10–45)
ANION GAP SERPL CALC-SCNC: 8 MMOL/L — SIGNIFICANT CHANGE UP (ref 5–17)
AST SERPL-CCNC: 32 U/L — SIGNIFICANT CHANGE UP (ref 10–40)
BASOPHILS # BLD AUTO: 0 K/UL — SIGNIFICANT CHANGE UP (ref 0–0.2)
BASOPHILS NFR BLD AUTO: 0 % — SIGNIFICANT CHANGE UP (ref 0–2)
BILIRUB SERPL-MCNC: 0.3 MG/DL — SIGNIFICANT CHANGE UP (ref 0.2–1.2)
BUN SERPL-MCNC: 19 MG/DL — SIGNIFICANT CHANGE UP (ref 7–23)
CALCIUM SERPL-MCNC: 7.9 MG/DL — LOW (ref 8.4–10.5)
CHLORIDE SERPL-SCNC: 98 MMOL/L — SIGNIFICANT CHANGE UP (ref 96–108)
CO2 SERPL-SCNC: 25 MMOL/L — SIGNIFICANT CHANGE UP (ref 22–31)
CREAT SERPL-MCNC: 0.75 MG/DL — SIGNIFICANT CHANGE UP (ref 0.5–1.3)
EGFR: 91 ML/MIN/1.73M2 — SIGNIFICANT CHANGE UP
EOSINOPHIL # BLD AUTO: 0 K/UL — SIGNIFICANT CHANGE UP (ref 0–0.5)
EOSINOPHIL NFR BLD AUTO: 0 % — SIGNIFICANT CHANGE UP (ref 0–6)
FERRITIN SERPL-MCNC: 241 NG/ML — SIGNIFICANT CHANGE UP (ref 30–400)
GLUCOSE SERPL-MCNC: 100 MG/DL — HIGH (ref 70–99)
HCT VFR BLD CALC: 34.2 % — LOW (ref 39–50)
HGB BLD-MCNC: 11.4 G/DL — LOW (ref 13–17)
IRON SATN MFR SERPL: 18 % — SIGNIFICANT CHANGE UP (ref 16–55)
IRON SATN MFR SERPL: 36 UG/DL — LOW (ref 45–165)
LYMPHOCYTES # BLD AUTO: 1.13 K/UL — SIGNIFICANT CHANGE UP (ref 1–3.3)
LYMPHOCYTES # BLD AUTO: 29.2 % — SIGNIFICANT CHANGE UP (ref 13–44)
MAGNESIUM SERPL-MCNC: 1.9 MG/DL — SIGNIFICANT CHANGE UP (ref 1.6–2.6)
MCHC RBC-ENTMCNC: 33.1 PG — SIGNIFICANT CHANGE UP (ref 27–34)
MCHC RBC-ENTMCNC: 33.3 G/DL — SIGNIFICANT CHANGE UP (ref 32–36)
MCV RBC AUTO: 99.4 FL — SIGNIFICANT CHANGE UP (ref 80–100)
MONOCYTES # BLD AUTO: 0.15 K/UL — SIGNIFICANT CHANGE UP (ref 0–0.9)
MONOCYTES NFR BLD AUTO: 3.8 % — SIGNIFICANT CHANGE UP (ref 2–14)
NEUTROPHILS # BLD AUTO: 2.44 K/UL — SIGNIFICANT CHANGE UP (ref 1.8–7.4)
NEUTROPHILS NFR BLD AUTO: 63.2 % — SIGNIFICANT CHANGE UP (ref 43–77)
NRBC # BLD: SIGNIFICANT CHANGE UP /100 WBCS (ref 0–0)
PHOSPHATE SERPL-MCNC: 2.7 MG/DL — SIGNIFICANT CHANGE UP (ref 2.5–4.5)
PLATELET # BLD AUTO: 139 K/UL — LOW (ref 150–400)
POTASSIUM SERPL-MCNC: 3.9 MMOL/L — SIGNIFICANT CHANGE UP (ref 3.5–5.3)
POTASSIUM SERPL-SCNC: 3.9 MMOL/L — SIGNIFICANT CHANGE UP (ref 3.5–5.3)
PROT SERPL-MCNC: 5.8 G/DL — LOW (ref 6–8.3)
RBC # BLD: 3.44 M/UL — LOW (ref 4.2–5.8)
RBC # FLD: 13.2 % — SIGNIFICANT CHANGE UP (ref 10.3–14.5)
SODIUM SERPL-SCNC: 131 MMOL/L — LOW (ref 135–145)
TIBC SERPL-MCNC: 195 UG/DL — LOW (ref 220–430)
UIBC SERPL-MCNC: 159 UG/DL — SIGNIFICANT CHANGE UP (ref 110–370)
WBC # BLD: 3.86 K/UL — SIGNIFICANT CHANGE UP (ref 3.8–10.5)
WBC # FLD AUTO: 3.86 K/UL — SIGNIFICANT CHANGE UP (ref 3.8–10.5)

## 2025-01-06 PROCEDURE — 82330 ASSAY OF CALCIUM: CPT

## 2025-01-06 PROCEDURE — 84100 ASSAY OF PHOSPHORUS: CPT

## 2025-01-06 PROCEDURE — 83930 ASSAY OF BLOOD OSMOLALITY: CPT

## 2025-01-06 PROCEDURE — 84133 ASSAY OF URINE POTASSIUM: CPT

## 2025-01-06 PROCEDURE — 85025 COMPLETE CBC W/AUTO DIFF WBC: CPT

## 2025-01-06 PROCEDURE — 99285 EMERGENCY DEPT VISIT HI MDM: CPT

## 2025-01-06 PROCEDURE — 84132 ASSAY OF SERUM POTASSIUM: CPT

## 2025-01-06 PROCEDURE — 87637 SARSCOV2&INF A&B&RSV AMP PRB: CPT

## 2025-01-06 PROCEDURE — 81001 URINALYSIS AUTO W/SCOPE: CPT

## 2025-01-06 PROCEDURE — 99239 HOSP IP/OBS DSCHRG MGMT >30: CPT

## 2025-01-06 PROCEDURE — 96375 TX/PRO/DX INJ NEW DRUG ADDON: CPT

## 2025-01-06 PROCEDURE — 83540 ASSAY OF IRON: CPT

## 2025-01-06 PROCEDURE — 84156 ASSAY OF PROTEIN URINE: CPT

## 2025-01-06 PROCEDURE — 71045 X-RAY EXAM CHEST 1 VIEW: CPT

## 2025-01-06 PROCEDURE — 82803 BLOOD GASES ANY COMBINATION: CPT

## 2025-01-06 PROCEDURE — 97161 PT EVAL LOW COMPLEX 20 MIN: CPT

## 2025-01-06 PROCEDURE — 84300 ASSAY OF URINE SODIUM: CPT

## 2025-01-06 PROCEDURE — 83605 ASSAY OF LACTIC ACID: CPT

## 2025-01-06 PROCEDURE — 96374 THER/PROPH/DIAG INJ IV PUSH: CPT

## 2025-01-06 PROCEDURE — 84439 ASSAY OF FREE THYROXINE: CPT

## 2025-01-06 PROCEDURE — 82570 ASSAY OF URINE CREATININE: CPT

## 2025-01-06 PROCEDURE — 85730 THROMBOPLASTIN TIME PARTIAL: CPT

## 2025-01-06 PROCEDURE — 83935 ASSAY OF URINE OSMOLALITY: CPT

## 2025-01-06 PROCEDURE — 80053 COMPREHEN METABOLIC PANEL: CPT

## 2025-01-06 PROCEDURE — 36415 COLL VENOUS BLD VENIPUNCTURE: CPT

## 2025-01-06 PROCEDURE — 84540 ASSAY OF URINE/UREA-N: CPT

## 2025-01-06 PROCEDURE — 85610 PROTHROMBIN TIME: CPT

## 2025-01-06 PROCEDURE — 83550 IRON BINDING TEST: CPT

## 2025-01-06 PROCEDURE — 80048 BASIC METABOLIC PNL TOTAL CA: CPT

## 2025-01-06 PROCEDURE — 87040 BLOOD CULTURE FOR BACTERIA: CPT

## 2025-01-06 PROCEDURE — 83735 ASSAY OF MAGNESIUM: CPT

## 2025-01-06 PROCEDURE — 84443 ASSAY THYROID STIM HORMONE: CPT

## 2025-01-06 PROCEDURE — 87086 URINE CULTURE/COLONY COUNT: CPT

## 2025-01-06 PROCEDURE — 84484 ASSAY OF TROPONIN QUANT: CPT

## 2025-01-06 PROCEDURE — 84295 ASSAY OF SERUM SODIUM: CPT

## 2025-01-06 PROCEDURE — 82728 ASSAY OF FERRITIN: CPT

## 2025-01-06 PROCEDURE — 93005 ELECTROCARDIOGRAM TRACING: CPT

## 2025-01-06 RX ORDER — OSELTAMIVIR 75 MG/1
1 CAPSULE ORAL
Qty: 5 | Refills: 0
Start: 2025-01-06

## 2025-01-06 RX ORDER — MAGNESIUM SULFATE 500 MG/ML
2 INJECTION, SOLUTION INTRAMUSCULAR; INTRAVENOUS ONCE
Refills: 0 | Status: DISCONTINUED | OUTPATIENT
Start: 2025-01-06 | End: 2025-01-06

## 2025-01-06 RX ORDER — MAG HYDROX/ALUMINUM HYD/SIMETH 200-200-20
30 SUSPENSION, ORAL (FINAL DOSE FORM) ORAL
Qty: 0 | Refills: 0 | DISCHARGE
Start: 2025-01-06

## 2025-01-06 RX ORDER — POTASSIUM PHOSPHATE, MONOBASIC AND POTASSIUM PHOSPHATE, DIBASIC 224; 236 MG/ML; MG/ML
15 INJECTION, SOLUTION INTRAVENOUS ONCE
Refills: 0 | Status: DISCONTINUED | OUTPATIENT
Start: 2025-01-06 | End: 2025-01-06

## 2025-01-06 RX ADMIN — Medication 5 MILLIGRAM(S): at 11:45

## 2025-01-06 RX ADMIN — TRAZODONE HYDROCHLORIDE 50 MILLIGRAM(S): 150 TABLET ORAL at 11:45

## 2025-01-06 RX ADMIN — MONTELUKAST SODIUM 10 MILLIGRAM(S): 10 TABLET, FILM COATED ORAL at 11:46

## 2025-01-06 RX ADMIN — ENOXAPARIN SODIUM 40 MILLIGRAM(S): 60 INJECTION INTRAVENOUS; SUBCUTANEOUS at 17:22

## 2025-01-06 RX ADMIN — Medication 1 TABLET(S): at 11:46

## 2025-01-06 RX ADMIN — LISINOPRIL 10 MILLIGRAM(S): 30 TABLET ORAL at 11:45

## 2025-01-06 RX ADMIN — POLYSORBATE 80 1 DROP(S): 100 SOLUTION/ DROPS OPHTHALMIC at 11:46

## 2025-01-06 RX ADMIN — CEFTRIAXONE SODIUM 100 MILLIGRAM(S): 1 INJECTION, POWDER, FOR SOLUTION INTRAMUSCULAR; INTRAVENOUS at 09:49

## 2025-01-06 RX ADMIN — OSELTAMIVIR 75 MILLIGRAM(S): 75 CAPSULE ORAL at 08:11

## 2025-01-06 NOTE — CONSULT NOTE ADULT - ASSESSMENT
I M    79-year-old male history of intellectual disability, asthma, BPH, glaucoma, hypertension brought in from his group home for 1 week of worsening weakness, chills and cough. Being treated for hyponatremia and weakness in the setting of influenza a infection.     Problem/Plan - 1:  ·  Problem: Influenza A.   ·  Plan: Presenting with over one month of flu like symptoms, per aid, now with worsening productive cough as well as increased weakness. Tmax of 102 in ed. Now s/p Azithromycin 500mg IV x 1, CTX 1g x 1, Tamiflu 75mg x 1 in the ED.     Plan   - continue with Tamiflu 75mg QD  - Tylenol as needed for pain   - Encourage PO fluids.    Problem/Plan - 2:  ·  Problem: Hyponatremia.   ·  Plan: Na: 127 on admission. Urine OSM: 209, Favian: 27, Urea: 253,   Likely hypovolemic hyponatremia given 1 week of decreased Po intake and low urine sodium and urea leading to reabsorption of fluids via RAAS.   s/p 2200 ml in ED     Plan   - f/u repeat sodium in am   - f/u repeat Urine Osums in am.    Problem/Plan - 3:  ·  Problem: Acute UTI.   ·  Plan: UA+ for nitrites and WBC. S/p CTX 1g in ED. Patient w/o any complaints at this time but does not always verbalize discomfort.     Plan   - Continue with CTX 1g x 3 days (1/4-1/6).    Problem/Plan - 4:  ·  Problem: BPH (benign prostatic hyperplasia).   ·  Plan: home medications: finasteride 5mg, Tamsulosin 0.4mg BID. Usually requires use of purwick at facility     Plan   - continue with finasteride 5mg and Tamsulosin 0.8mg (0.4mg x 2)   - parr placed in ED to obtain urine sample, can consider trial of void in am.    Problem/Plan - 5:  ·  Problem: Asthma.   ·  Plan: no wheezing noted on exam   home medications: Albuterol 90mcg q6 prn, montelukast 10mg oral tabs daily   Saturating 96% on room air, no increased work of breathing on exam   CXR w/o any acute findings     Plan   - continue with home medications   - Supplemental O2 as needed.    Problem/Plan - 6:  ·  Problem: Glaucoma.   ·  Plan: home medications: systane opthalmic solution   plan   - continue home medications.    Problem/Plan - 7:  ·  Problem: Hypertension.   ·  Plan: intially hypertensive on arrival with systolic BP > 170, however repeat BP of 151 systolic   Home medication: lisinopril 10mg     Plan   - can restart home medication for BP > 180.    Problem/Plan - 8:  ·  Problem: Intellectual disability.   ·  Plan: lives in group home. A&Ox3 however does not always respond to questions. Will also state his is ok but change answers. Per aid at bedside this is patient's baseline   Home medications: Trazodone 50mg daily, Trifluoperazine 5mg   Plan   - continue home medications.    Problem/Plan - 9:  ·  Problem: Weakness.   ·  Plan: likely secondary to decreased po intake     Plan   - PT/OT consult   - treat infection as above.    Problem/Plan - 10:  ·  Problem: Prophylactic measure.   ·  Plan; Plan:  F: s/p 2L NS in the ED   E: replete K<4, Mg<2  N: regular  VTE Prophylaxis: Lovenox   GI: home pepcid  C: Full Code  D: RMF.

## 2025-01-06 NOTE — PROGRESS NOTE ADULT - PROBLEM SELECTOR PLAN 5
no wheezing noted on exam   home medications: Albuterol 90mcg q6 prn, montelukast 10mg oral tabs daily   Saturating 96% on room air, no increased work of breathing on exam   CXR w/o any acute findings     Plan   - continue with home medications   - Supplemental O2 as needed
no wheezing noted on exam   home medications: Albuterol 90mcg q6 prn, montelukast 10mg oral tabs daily   Saturating 96% on room air, no increased work of breathing on exam   CXR w/o any acute findings     Plan   - continue with home medications   - Supplemental O2 as needed

## 2025-01-06 NOTE — DISCHARGE NOTE PROVIDER - CARE PROVIDER_API CALL
Ember Sandoval  [facility]  [facility] - Room 3N16  [address] [place] [address]  Phone: [phone]  Fax: (   )    -  Scheduled Appointment: 01/14/2025 12:30 PM

## 2025-01-06 NOTE — DISCHARGE NOTE NURSING/CASE MANAGEMENT/SOCIAL WORK - NSDCPEFALRISK_GEN_ALL_CORE
For information on Fall & Injury Prevention, visit: [email or web address] OR  [email or web address] OR  https://www.cdc.gov/steadi/patient.html

## 2025-01-06 NOTE — PROGRESS NOTE ADULT - PROBLEM SELECTOR PLAN 6
home medications: systane opthalmic solution   plan   - continue home medications
home medications: systane opthalmic solution   plan   - continue home medications

## 2025-01-06 NOTE — PROGRESS NOTE ADULT - PROBLEM SELECTOR PLAN 3
Na: 127 on admission. Urine OSM: 209, Favian: 27, Urea: 253,   Likely hypovolemic hyponatremia given 1 week of decreased Po intake and low urine sodium and urea leading to reabsorption of fluids via RAAS, however unclear if urine studies obtain before or after fluid resuscitation   s/p 2200 ml in ED     Plan   - repeat sodium in   - continue to encourage PO intake Na: 127 on admission. Urine OSM: 209, Favian: 27, Urea: 253,   Likely hypovolemic hyponatremia given 1 week of decreased Po intake and low urine sodium and urea leading to reabsorption of fluids via RAAS, however unclear if urine studies obtain before or after fluid resuscitation   s/p 2200 ml in ED     Plan   - repeat sodium today 131  - continue to encourage PO intake

## 2025-01-06 NOTE — PROGRESS NOTE ADULT - SUBJECTIVE AND OBJECTIVE BOX
*****INCOMPLETE NOTE*****    INTERVAL HPI/OVERNIGHT EVENTS:    SUBJECTIVE: Patient seen and examined at bedside, resting comfortably in bed, and does not appear to be in any acute distress. Patient reports    Vital Signs Last 24 Hrs  T(C): 36.8 (06 Jan 2025 06:15), Max: 36.8 (05 Jan 2025 22:20)  T(F): 98.2 (06 Jan 2025 06:15), Max: 98.3 (05 Jan 2025 22:20)  HR: 64 (06 Jan 2025 06:15) (63 - 74)  BP: 117/65 (06 Jan 2025 06:15) (113/51 - 134/74)  BP(mean): 96 (05 Jan 2025 22:20) (96 - 96)  RR: 18 (06 Jan 2025 06:15) (18 - 18)  SpO2: 98% (06 Jan 2025 06:15) (94% - 98%)    Parameters below as of 06 Jan 2025 06:15  Patient On (Oxygen Delivery Method): room air        PHYSICAL EXAM:  General: in no acute distress  Eyes: EOMI intact bilaterally. Anicteric sclerae, moist conjunctivae  HENT: Moist mucous membranes  Neck: Trachea midline, supple  Lungs: CTA B/L. No wheezes, rales, or rhonchi  Cardiovascular: RRR. No murmurs, rubs, or gallops  Abdomen: Soft, non-tender non-distended; No rebound or guarding  Extremities: WWP, No clubbing, cyanosis or edema  Neurological: Alert and oriented  Skin: Warm and dry. No obvious rash     LABS:                        11.6   5.41  )-----------( 124      ( 05 Jan 2025 05:30 )             35.7     01-05    131[L]  |  97  |  14  ----------------------------<  94  3.6   |  25  |  0.74    Ca    8.0[L]      05 Jan 2025 05:30    TPro  7.0  /  Alb  3.5  /  TBili  0.5  /  DBili  x   /  AST  17  /  ALT  9[L]  /  AlkPhos  95  01-04   INTERVAL HPI/OVERNIGHT EVENTS: YA o/n    SUBJECTIVE: Patient seen and examined at bedside, resting comfortably in bed, and does not appear to be in any acute distress. Patient denies having any pain or other discomfort, stating "I'm okay."    Vital Signs Last 24 Hrs  T(C): 36.8 (06 Jan 2025 06:15), Max: 36.8 (05 Jan 2025 22:20)  T(F): 98.2 (06 Jan 2025 06:15), Max: 98.3 (05 Jan 2025 22:20)  HR: 64 (06 Jan 2025 06:15) (63 - 74)  BP: 117/65 (06 Jan 2025 06:15) (113/51 - 134/74)  BP(mean): 96 (05 Jan 2025 22:20) (96 - 96)  RR: 18 (06 Jan 2025 06:15) (18 - 18)  SpO2: 98% (06 Jan 2025 06:15) (94% - 98%)    Parameters below as of 06 Jan 2025 06:15  Patient On (Oxygen Delivery Method): room air        PHYSICAL EXAM:  General: in no acute distress  Eyes: Anicteric sclerae, moist conjunctivae  HENT: Moist mucous membranes  Neck: Trachea midline, supple  Lungs: CTA B/L. No wheezes, rales, or rhonchi  Cardiovascular: RRR. No murmurs, rubs, or gallops  Abdomen: Soft, non-tender non-distended; No rebound or guarding  Extremities: WWP, No clubbing, cyanosis or edema  Neurological: Alert and oriented to person, place, month/year  Skin: Warm and dry. No obvious rash     LABS:                        11.6   5.41  )-----------( 124      ( 05 Jan 2025 05:30 )             35.7     01-05    131[L]  |  97  |  14  ----------------------------<  94  3.6   |  25  |  0.74    Ca    8.0[L]      05 Jan 2025 05:30    TPro  7.0  /  Alb  3.5  /  TBili  0.5  /  DBili  x   /  AST  17  /  ALT  9[L]  /  AlkPhos  95  01-04

## 2025-01-06 NOTE — DISCHARGE NOTE PROVIDER - PROVIDER TOKENS
FREE:[LAST:[Jaime],FIRST:[Ember],PHONE:[phone]],FAX:[(   )    -],ADDRESS:[[facility]  [facility] Building - Room [address] [place] [address]],SCHEDULEDAPPT:[01/14/2025],SCHEDULEDAPPTTIME:[12:30 PM]]

## 2025-01-06 NOTE — PROGRESS NOTE ADULT - ASSESSMENT
79-year-old male history of intellectual disability, asthma, BPH, glaucoma, hypertension brought in from his group home for 1 week of worsening weakness, chills and cough. Being treated for hyponatremia and weakness in the setting of influenza a infection. 
79-year-old male history of intellectual disability, asthma, BPH, glaucoma, hypertension brought in from his group home for 1 week of worsening weakness, chills and cough. Being treated for hyponatremia and weakness in the setting of influenza a infection.

## 2025-01-06 NOTE — PROGRESS NOTE ADULT - ATTENDING COMMENTS
79-year-old male history of intellectual disability, asthma, BPH, glaucoma, hypertension brought in from his group home for 1 week of worsening weakness, chills and cough. Pt meeting 2/4 with fever and tachycardia, meeting sepsis criteria initially with likely source UTI vs flu. As pt was noted to be having upper respiratory symptoms for weeks prior, however presenting with generalized weakness, more inclined to believe source of symptoms related to UTI; pt flu+ but without notable other URI symptoms    #Sepsis 2/2 UTI vs flu (improved)  - Cont CTX 1g q24hrs   - f/u UCx  - f/u BCx
79M with a PMH of intellectual disability, asthma, BPH, glaucoma, and HTN who was brought in for generalized weakness, chills, and cough, admitted for hyponatremia and weakness in setting of flu infection, now improved and ready for discharge.     VS reviewed and stable, low grade fever yesterday but none new     Exam:   Appears comfortable   MMM  Normal WOB on RA, CTAB   RRR, no mrg   Abdomen soft, nontender, nondistended  Extremities warm and without edema   AOX0 but baseline, mumbling to self     Labs reviewed, CBC stable this morning   Na 131, rest of BMP normal   Iron studies with mildly decreased iron   Blood cultures NGTD   Urine culture with usual chantelle     No new imaging     A/P:   -Finish 3d CTX today for UTI treatment   -Sodium improved after IV fluids, continue to encourage PO intake   -Continue tamiflu to complete 5 day course     Rest as per resident note.

## 2025-01-06 NOTE — PROGRESS NOTE ADULT - PROBLEM SELECTOR PLAN 2
Presenting with over one month of flu like symptoms, per aid, now with worsening productive cough as well as increased weakness. Currently denies URI symptoms, however unclear if pt is always able to verbalize symptoms. Possibly persistently positive test post-infection. Tmax of 102 in ed. Now s/p Azithromycin 500mg IV x 1, CTX 1g x 1, Tamiflu 75mg x 1 in the ED.       Plan   - continue with Tamiflu 75mg QD x 5 days  - Tylenol as needed for pain   - Encourage PO fluids Presenting with over one month of flu like symptoms, per aid, now with worsening productive cough as well as increased weakness. Currently denies URI symptoms, however unclear if pt is always able to verbalize symptoms. Possibly persistently positive test post-infection. Tmax of 102 in ed. Now s/p Azithromycin 500mg IV x 1, CTX 1g x 1, Tamiflu 75mg x 1 in the ED.       Plan   - continue with Tamiflu 75mg QD x 5 days, EOT 1/8  - Tylenol as needed for pain   - Encourage PO fluids

## 2025-01-06 NOTE — PROGRESS NOTE ADULT - PROBLEM SELECTOR PLAN 8
lives in group home. A&Ox3 however does not always respond to questions. Will also state his is ok but change answers. Per aid at bedside this is patient's baseline   Home medications: Trazodone 50mg daily, Trifluoperazine 5mg   Plan   - continue home medications
lives in group home. A&Ox3 however does not always respond to questions. Will also state his is ok but change answers. Per aid at bedside this is patient's baseline   Home medications: Trazodone 50mg daily, Trifluoperazine 5mg   Plan   - continue home medications

## 2025-01-06 NOTE — PROGRESS NOTE ADULT - PROBLEM SELECTOR PLAN 4
home medications: finasteride 5mg, Tamsulosin 0.4mg BID. Usually requires use of purwick at facility     Plan   - continue with finasteride 5mg and Tamsulosin 0.8mg (0.4mg x 2)   - parr placed in ED to obtain urine sample, can consider trial of void

## 2025-01-06 NOTE — CONSULT NOTE ADULT - SUBJECTIVE AND OBJECTIVE BOX
Patient is a 79y old  Male who presents with a chief complaint of Flu-Like Symptoms, Weakness (06 Jan 2025 07:46)      HPI:  79-year-old male history of intellectual disability, asthma, BPH, glaucoma, hypertension sonal in from his group home for 1 week of worsening weakness, chills and cough.     Per aid from group home, in october patient developed a cough, runny nose, and intermittent chills. Since then he has had repeated trips to urgent care where he was told he had a cold and was sent back home with supportive therapy. During the past week the aid has noticed that he has become progressively weaker. Normally he is first to get out of bed but has been getting up later than usual. Also walking less often than before and then today  seem to be having difficulty lifting up the cutlery and glass because of generalized weakness.  Per aid patient has also been eating and drinking less than usual this past week. Yesterday she noticed his cough had developed from an intermittent dry cough to a productive cough, although she could not recall the color of the sputum. Normally has a good appetite but over the past few days has not been finishing his meals.       Patient denies chest pain, difficulty breathing, abdominal pain, nausea, vomiting, diarrhea, sweats/chills.       ED Course   Vitals T: 102, BP: 173/75, RR: 22, 96% on RA  Hgb: 11.7, Hct: 35.1, WBC: 6.27 Neutrophil: 90.4%  Na: 127, K: 3.6, Cl: 96, CO2: 24, Trop: 36, Lactate 3.2 ----> 0.8   Imaging: CXR No acute findings   RVP: + Flu   Interventions: Azithromycin 500mg IV x 1, CTX 1g x 1, Tamiflu 75mg x 1, NaCl 2200ml IV bolus, BCx drawn, parr placed       (04 Jan 2025 15:25)    PAST MEDICAL & SURGICAL HISTORY:  Asthma      History of developmental disability      Glaucoma      BPH (benign prostatic hyperplasia)      Hypertension        MEDICATIONS  (STANDING):  artificial  tears Solution 1 Drop(s) Both EYES daily  cefTRIAXone   IVPB 1000 milliGRAM(s) IV Intermittent every 24 hours  enoxaparin Injectable 40 milliGRAM(s) SubCutaneous every 24 hours  finasteride 5 milliGRAM(s) Oral daily  fluticasone propionate 50 MICROgram(s)/spray Nasal Spray 1 Spray(s) Both Nostrils once  lisinopril 10 milliGRAM(s) Oral daily  magnesium sulfate  IVPB 2 Gram(s) IV Intermittent once  montelukast 10 milliGRAM(s) Oral daily  multivitamin/minerals 1 Tablet(s) Oral daily  oseltamivir 75 milliGRAM(s) Oral two times a day  potassium phosphate IVPB 15 milliMole(s) IV Intermittent once  tamsulosin 0.8 milliGRAM(s) Oral at bedtime  traZODone 50 milliGRAM(s) Oral daily    MEDICATIONS  (PRN):  acetaminophen     Tablet .. 650 milliGRAM(s) Oral every 6 hours PRN Temp greater or equal to 38C (100.4F), Mild Pain (1 - 3)  albuterol    90 MICROgram(s) HFA Inhaler 2 Puff(s) Inhalation every 6 hours PRN Shortness of Breath  aluminum hydroxide/magnesium hydroxide/simethicone Suspension 30 milliLiter(s) Oral every 4 hours PRN Dyspepsia  melatonin 3 milliGRAM(s) Oral at bedtime PRN Insomnia  ondansetron Injectable 4 milliGRAM(s) IV Push every 8 hours PRN Nausea and/or Vomiting        FAMILY HISTORY:      CBC Full  -  ( 06 Jan 2025 07:25 )  WBC Count : 3.86 K/uL  RBC Count : 3.44 M/uL  Hemoglobin : 11.4 g/dL  Hematocrit : 34.2 %  Platelet Count - Automated : 139 K/uL  Mean Cell Volume : 99.4 fl  Mean Cell Hemoglobin : 33.1 pg  Mean Cell Hemoglobin Concentration : 33.3 g/dL  Auto Neutrophil # : x  Auto Lymphocyte # : x  Auto Monocyte # : x  Auto Eosinophil # : x  Auto Basophil # : x  Auto Neutrophil % : x  Auto Lymphocyte % : x  Auto Monocyte % : x  Auto Eosinophil % : x  Auto Basophil % : x      01-06    131[L]  |  98  |  x   ----------------------------<  100[H]  3.9   |  25  |  0.75    Ca    7.9[L]      06 Jan 2025 07:25  Phos  2.7     01-06  Mg     1.9     01-06    TPro  5.8[L]  /  Alb  3.0[L]  /  TBili  0.3  /  DBili  x   /  AST  32  /  ALT  13  /  AlkPhos  68  01-06      Urinalysis Basic - ( 06 Jan 2025 07:25 )    Color: x / Appearance: x / SG: x / pH: x  Gluc: 100 mg/dL / Ketone: x  / Bili: x / Urobili: x   Blood: x / Protein: x / Nitrite: x   Leuk Esterase: x / RBC: x / WBC x   Sq Epi: x / Non Sq Epi: x / Bacteria: x        Radiology :     < from: Xray Chest 1 View- PORTABLE-Urgent (Xray Chest 1 View- PORTABLE-Urgent .) (01.04.25 @ 10:33) >    ACC: [id number] EXAM:  XR CHEST PORTABLE URGENT 1V   ORDERED BY: FAITH LAMAR   DIRECTOR     PROCEDURE DATE:  01/04/2025          INTERPRETATION:  INDICATIONS: Fever    Prior examination for comparison: 2/3/2023    Technique: AP view of chest    Findings:    The lungs are clear. There are no pleural effusions. The   cardiomediastinal silhouette is normal. Bones are grossly normal.    IMPRESSION: No acute cardiopulmonary disease.         Review of Systems : per HPI         Vital Signs Last 24 Hrs  T(C): 36.8 (06 Jan 2025 06:15), Max: 36.8 (05 Jan 2025 22:20)  T(F): 98.2 (06 Jan 2025 06:15), Max: 98.3 (05 Jan 2025 22:20)  HR: 64 (06 Jan 2025 06:15) (63 - 74)  BP: 117/65 (06 Jan 2025 06:15) (113/51 - 134/74)  BP(mean): 96 (05 Jan 2025 22:20) (96 - 96)  RR: 18 (06 Jan 2025 06:15) (18 - 18)  SpO2: 98% (06 Jan 2025 06:15) (94% - 98%)    Parameters below as of 06 Jan 2025 06:15  Patient On (Oxygen Delivery Method): room air            Physical Exam:   DROPLET Inlf A  isolation ,  79 y o manlying comfortably in semi Crawford's position , awake ,  NAD     Head: normocephalic , atraumatic    Eyes: PERRLA , EOMI , no nystagmus , sclera anicteric    ENT / FACE: neg nasal discharge , uvula midline , no oropharyngeal erythema / exudate    Neck: supple , negative JVD , negative carotid bruits , no thyromegaly    Chest:  coarse bs bilaterally     Cardiovascular: regular rate and rhythm , neg murmurs / rubs / gallops    Abdomen: soft , non distended , no tenderness to palpation in all 4 quadrants ,  normal bowel sounds     Extremities: WWP , neg cyanosis /clubbing / edema     Neurologic Exam:     Alert and oriented to person      Cranial Nerves:           II:                         pupils equal , round and reactive to light , visual fields intact         III/ IV/VI:             extraocular movements intact , neg nystagmus , neg ptosis        V:                        facial sensation intact , V1-3 normal        VII:                      face symmetric , no droop , normal eye closure and smile        VIII:                     hearing intact to finger rub bilaterally        IX and X:             no hoarseness , gag intact , palate/ uvula rise symmetrically        XI:                       SCM / trapezius strength intact bilateral        XII:                      no tongue deviation    Motor Exam:        > 3+/5 x 4 extremities , without drift     Sensation:         intact to light touch x 4 extremities                            no neglect or extinction on double simultaneous testing    DTR:           biceps/brachioradialis: equal                            patella/ankle: equal          neg Babinski     Coordination:            Finger to Nose:  neg dysmetria bilaterally      Gait:  not tested         PM&R Impression: admitted for Infl A , hyponatremia     - deconditioned       Recommendations / Plan:       1) Physical / Occupational therapy focusing on therapeutic exercises , equipment evaluation , bed mobility/transfer out of bed evaluation , progressive ambulation with assistive devices prn .    2) Current disposition plan recommendation:    probable d/c home with home p.t.

## 2025-01-06 NOTE — DISCHARGE NOTE NURSING/CASE MANAGEMENT/SOCIAL WORK - FINANCIAL ASSISTANCE
[facility] provides services at a reduced cost to those who are determined to be eligible through [facility]’s financial assistance program. Information regarding [facility]’s financial assistance program can be found by going to [email or web address] or by calling [phone].

## 2025-01-06 NOTE — DISCHARGE NOTE PROVIDER - NSDCMRMEDTOKEN_GEN_ALL_CORE_FT
Albuterol (Eqv-Proventil HFA) 90 mcg/inh inhalation aerosol: 2 puff(s) inhaled every 6 hours, As Needed  aluminum hydroxide-magnesium hydroxide 200 mg-200 mg/5 mL oral suspension: 30 milliliter(s) orally every 4 hours As needed Dyspepsia  aspirin 81 mg oral tablet: 1 tab(s) orally once a day  finasteride 5 mg oral tablet: 1 tab(s) orally once a day  fluticasone 50 mcg/inh nasal spray: 1 spray(s) nasal once a day  lisinopril 10 mg oral tablet: 1 tab(s) orally once a day  montelukast 10 mg oral tablet: 1 tab(s) orally once a day  oseltamivir 75 mg oral capsule: 1 cap(s) orally 2 times a day through 1/8/25  Systane Ultra ophthalmic solution: 1 drop(s) to each affected eye 3 times a day  tamsulosin 0.4 mg oral capsule: 1 cap(s) orally once a day  Thera-M oral tablet: 1 tab(s) orally once a day  traZODone 50 mg oral tablet: 1 tab(s) orally once a day  trifluoperazine 5 mg oral tablet: 1 tab(s) orally once a day

## 2025-01-06 NOTE — DISCHARGE NOTE PROVIDER - HOSPITAL COURSE
#Discharge: do not delete    79-year-old male history of intellectual disability, asthma, BPH, glaucoma, hypertension brought in from his group home for 1 week of worsening weakness, chills and cough. Found to be positive for influenza A, treated with Tamiflu, URI symptoms now resolved, and UTI, completed 3 days of IV CTX. Also found to have hyponatremia     Hospital course (by problem):     Patient was discharged to: home    New medications:   Changes to old medications:  Medications that were stopped:    Items to follow up as outpatient:    Physical exam at the time of discharge:       #Discharge: do not delete    79-year-old male history of intellectual disability, asthma, BPH, glaucoma, hypertension brought in from his group home for 1 week of worsening weakness, chills and cough. Found to be positive for influenza A, treated with Tamiflu, URI symptoms now resolved, and UTI, completed 3 days of IV CTX. Also found to have hyponatremia, likely secondary to poor PO intake, improved with fluids and encouraging PO. Stable for discharge.     Hospital course (by problem):   #Acute UTI.   Met sepsis criteria on admission (fever, tachypnea)   UA+ for nitrites and WBC. S/p CTX 1g in ED. Patient w/o any complaints at this time but does not always verbalize discomfort.   - S/p CTX 1g x 3 days (1/4-1/6).    #Influenza A.   Presenting with over one month of flu like symptoms, per aid, now with worsening productive cough as well as increased weakness. Currently denies URI symptoms, however unclear if pt is always able to verbalize symptoms. Possibly persistently positive test post-infection. Tmax of 102 in ed. Now s/p Azithromycin 500mg IV x 1, CTX 1g x 1, Tamiflu 75mg x 1 in the ED.   - continue with Tamiflu 75mg QD x 5 days, EOT 1/8  - Tylenol as needed for pain   - Encourage PO fluids.    #Hyponatremia.   Na: 127 on admission. Urine OSM: 209, Favian: 27, Urea: 253,   Likely hypovolemic hyponatremia given 1 week of decreased Po intake and low urine sodium and urea leading to reabsorption of fluids via RAAS, however unclear if urine studies obtain before or after fluid resuscitation   s/p 2200 ml in ED   - repeat sodium today 131  - continue to encourage PO intake.    #BPH (benign prostatic hyperplasia).   home medications: finasteride 5mg, Tamsulosin 0.4mg BID. Usually requires use of purwick at facility    - continue with finasteride 5mg and Tamsulosin 0.8mg (0.4mg x 2)   - parr placed in ED to obtain urine sample, can consider trial of void.    #Asthma.   no wheezing noted on exam   home medications: Albuterol 90mcg q6 prn, montelukast 10mg oral tabs daily   Saturating 96% on room air, no increased work of breathing on exam   CXR w/o any acute findings   - continue with home medications   - Supplemental O2 as needed.    #Glaucoma.   home medications: systane opthalmic solution    - continue home medications.    #Hypertension.   initially hypertensive on arrival with systolic BP > 170, however repeat BP of 151 systolic   Home medication: lisinopril 10mg   - can restart home medication for BP > 180.    #Intellectual disability.   lives in group home. A&Ox3 however does not always respond to questions. Will also state his is ok but change answers. Per aid at bedside this is patient's baseline  Home medications: Trazodone 50mg daily, Trifluoperazine 5mg   - continue home medications.    #Weakness.   likely secondary to decreased po intake   - PT/OT consult recommended home PT  - treat infection as above.      Patient was discharged to: home    New medications: Tamiflu 75 mg BID through 1/8  Changes to old medications: n/a  Medications that were stopped: n/a    Items to follow up as outpatient: PCP    Physical exam at the time of discharge:  General: in no acute distress  Eyes: Anicteric sclerae, moist conjunctivae  HENT: Moist mucous membranes  Neck: Trachea midline, supple  Lungs: CTA B/L. No wheezes, rales, or rhonchi  Cardiovascular: RRR. No murmurs, rubs, or gallops  Abdomen: Soft, non-tender non-distended; No rebound or guarding  Extremities: WWP, No clubbing, cyanosis or edema  Neurological: Alert and oriented to person, place, month/year  Skin: Warm and dry. No obvious rash        #Discharge: do not delete    79-year-old male history of intellectual disability, asthma, BPH, glaucoma, hypertension brought in from his group home for 1 week of worsening weakness, chills and cough. Found to be positive for influenza A, treated with Tamiflu, URI symptoms now resolved, and UTI, completed 3 days of IV CTX. Also found to have hyponatremia, likely secondary to poor PO intake, improved with fluids and encouraging PO. Stable for discharge.     Hospital course (by problem):   #Acute UTI.   Met sepsis criteria on admission (fever, tachypnea)   UA+ for nitrites and WBC. S/p CTX 1g in ED. Patient w/o any complaints at this time but does not always verbalize discomfort.   - S/p CTX 1g x 3 days (1/4-1/6).    #Influenza A.   Presenting with over one month of flu like symptoms, per aid, now with worsening productive cough as well as increased weakness. Currently denies URI symptoms, however unclear if pt is always able to verbalize symptoms. Possibly persistently positive test post-infection. Tmax of 102 in ed. Now s/p Azithromycin 500mg IV x 1, CTX 1g x 1, Tamiflu 75mg x 1 in the ED.   - continue with Tamiflu 75mg QD x 5 days, EOT 1/8  - Tylenol as needed for pain   - Encourage PO fluids.    #Hyponatremia.   Na: 127 on admission. Urine OSM: 209, Favian: 27, Urea: 253,   Likely hypovolemic hyponatremia given 1 week of decreased Po intake and low urine sodium and urea leading to reabsorption of fluids via RAAS, however unclear if urine studies obtain before or after fluid resuscitation   s/p 2200 ml in ED   - repeat sodium today 131  - continue to encourage PO intake.    #BPH (benign prostatic hyperplasia).   home medications: finasteride 5mg, Tamsulosin 0.4mg BID. Usually requires use of purwick at facility    - continue with finasteride 5mg and Tamsulosin 0.8mg (0.4mg x 2)   - parr placed in ED to obtain urine sample    #Asthma.   no wheezing noted on exam   home medications: Albuterol 90mcg q6 prn, montelukast 10mg oral tabs daily   Saturating 96% on room air, no increased work of breathing on exam   CXR w/o any acute findings   - continue with home medications   - Supplemental O2 as needed.    #Glaucoma.   home medications: systane opthalmic solution    - continue home medications.    #Hypertension.   initially hypertensive on arrival with systolic BP > 170, however repeat BP of 151 systolic   Home medication: lisinopril 10mg   - can restart home medication for BP > 180.    #Intellectual disability.   lives in group home. A&Ox3 however does not always respond to questions. Will also state his is ok but change answers. Per aid at bedside this is patient's baseline  Home medications: Trazodone 50mg daily, Trifluoperazine 5mg   - continue home medications.    #Weakness.   likely secondary to decreased po intake   - PT/OT consult recommended home PT  - treat infection as above.      Patient was discharged to: home    New medications: Tamiflu 75 mg BID through 1/8  Changes to old medications: n/a  Medications that were stopped: n/a    Items to follow up as outpatient: PCP    Physical exam at the time of discharge:  General: in no acute distress  Eyes: Anicteric sclerae, moist conjunctivae  HENT: Moist mucous membranes  Neck: Trachea midline, supple  Lungs: CTA B/L. No wheezes, rales, or rhonchi  Cardiovascular: RRR. No murmurs, rubs, or gallops  Abdomen: Soft, non-tender non-distended; No rebound or guarding  Extremities: WWP, No clubbing, cyanosis or edema  Neurological: Alert and oriented to person, place, month/year  Skin: Warm and dry. No obvious rash

## 2025-01-06 NOTE — DISCHARGE NOTE PROVIDER - ATTENDING DISCHARGE PHYSICAL EXAMINATION:
Appears comfortable   MMM  Normal WOB on RA, CTAB   RRR, no mrg   Abdomen soft, nontender, nondistended  Extremities warm and without edema   AOX0, mumbling to self, appears to be at baseline described by facility

## 2025-01-06 NOTE — PROGRESS NOTE ADULT - PROBLEM SELECTOR PLAN 9
likely secondary to decreased po intake     Plan   - PT/OT consult   - treat infection as above
likely secondary to decreased po intake     Plan   - PT/OT consult   - treat infection as above

## 2025-01-06 NOTE — DISCHARGE NOTE PROVIDER - NSDCCPCAREPLAN_GEN_ALL_CORE_FT
PRINCIPAL DISCHARGE DIAGNOSIS  Diagnosis: Influenza A  Assessment and Plan of Treatment: Flu, also called influenza, is an infection of the nose, throat and lungs, which are part of the respiratory system. The flu is caused by a virus. Influenza viruses are different from the "stomach flu" viruses that cause diarrhea and vomiting.  Most people with the flu get better on their own. But sometimes, influenza and its complications can be deadly. To help protect against seasonal flu, you can get an annual flu shot. Although the vaccine isn't 100% effective, it lowers the chances of having severe complications from the flu. This is especially true for people who are at high risk of flu complications.  Please take Tamiflu twice daily until complete, through 1/8/25.  ****If you have dizziness, lightheadedness, confusion, shortness of breath, chest pain, loss of consciousness, or difficulty eating and drinking, please return to the hospital.      SECONDARY DISCHARGE DIAGNOSES  Diagnosis: Hyponatremia  Assessment and Plan of Treatment: Hyponatremia is when the amount of sodium in your blood is too low. Common causes include heart, liver, kidney and brain diseases, hormone issues and medications. Treatment could include limiting water intake, getting IV fluids and adjusting medications, depending on the cause. Because your hyponatremia was likely caused by decreased appetite while you were sick, you were given fluids and your sodium improved. Please continue to drink plenty of fluids and ensure you're eating adequately.   ****If you have dizziness, lightheadedness, confusion, shortness of breath, chest pain, loss of consciousness, or difficulty eating and drinking, please return to the hospital.    Diagnosis: Acute UTI  Assessment and Plan of Treatment: You were found to have a urinary tract infection and treated with IV antibiotics, which you completed in the hospital. UTI symptoms can include urinary frequency, urgency, foul smell, pain with urination, or cloudy urine. Please return to the hospital if you have any of these symptoms that are't going away.

## 2025-01-06 NOTE — PROGRESS NOTE ADULT - TIME BILLING
Bedside exam and interview   Reviewed vitals, labs   Discussed patient's plan of care with housestaff   Documentation of encounter  Excludes teaching time and/or separately reported services
Bedside exam and interview   Reviewed vitals, labs   Discussed patient's plan of care with housestaff   Documentation of encounter  Excludes teaching and separately reported services

## 2025-01-06 NOTE — PROGRESS NOTE ADULT - PROBLEM SELECTOR PLAN 7
intially hypertensive on arrival with systolic BP > 170, however repeat BP of 151 systolic   Home medication: lisinopril 10mg     Plan   - can restart home medication for BP > 180
intially hypertensive on arrival with systolic BP > 170, however repeat BP of 151 systolic   Home medication: lisinopril 10mg     Plan   - can restart home medication for BP > 180

## 2025-01-06 NOTE — PROGRESS NOTE ADULT - PROBLEM SELECTOR PLAN 1
Met sepsis criteria on admission (fever, tachypnea)   UA+ for nitrites and WBC. S/p CTX 1g in ED. Patient w/o any complaints at this time but does not always verbalize discomfort.     Plan   - Continue with CTX 1g x 3 days (1/4-1/6)

## 2025-01-09 LAB
CULTURE RESULTS: SIGNIFICANT CHANGE UP
CULTURE RESULTS: SIGNIFICANT CHANGE UP
SPECIMEN SOURCE: SIGNIFICANT CHANGE UP
SPECIMEN SOURCE: SIGNIFICANT CHANGE UP

## 2025-01-13 DIAGNOSIS — Z79.82 LONG TERM (CURRENT) USE OF ASPIRIN: ICD-10-CM

## 2025-01-13 DIAGNOSIS — J45.909 UNSPECIFIED ASTHMA, UNCOMPLICATED: ICD-10-CM

## 2025-01-13 DIAGNOSIS — N39.0 URINARY TRACT INFECTION, SITE NOT SPECIFIED: ICD-10-CM

## 2025-01-13 DIAGNOSIS — E87.1 HYPO-OSMOLALITY AND HYPONATREMIA: ICD-10-CM

## 2025-01-13 DIAGNOSIS — N40.0 BENIGN PROSTATIC HYPERPLASIA WITHOUT LOWER URINARY TRACT SYMPTOMS: ICD-10-CM

## 2025-01-13 DIAGNOSIS — E86.1 HYPOVOLEMIA: ICD-10-CM

## 2025-01-13 DIAGNOSIS — J10.1 INFLUENZA DUE TO OTHER IDENTIFIED INFLUENZA VIRUS WITH OTHER RESPIRATORY MANIFESTATIONS: ICD-10-CM

## 2025-01-13 DIAGNOSIS — H40.9 UNSPECIFIED GLAUCOMA: ICD-10-CM

## 2025-01-13 DIAGNOSIS — F79 UNSPECIFIED INTELLECTUAL DISABILITIES: ICD-10-CM

## 2025-01-13 DIAGNOSIS — E87.20 ACIDOSIS, UNSPECIFIED: ICD-10-CM

## 2025-01-13 DIAGNOSIS — Z79.899 OTHER LONG TERM (CURRENT) DRUG THERAPY: ICD-10-CM

## 2025-01-13 DIAGNOSIS — A41.9 SEPSIS, UNSPECIFIED ORGANISM: ICD-10-CM

## 2025-01-13 DIAGNOSIS — I10 ESSENTIAL (PRIMARY) HYPERTENSION: ICD-10-CM

## 2025-05-19 NOTE — ED ADULT NURSE NOTE - IS THE PATIENT ABLE TO BE SCREENED?
----- Message from Tuyet MURILLO sent at 9/2/2015  1:39 PM CDT -----  Regarding: Recall Colon  Recall colon in 10 years per ES. Colon done 6/11/15     No

## 2025-06-04 NOTE — ED PROVIDER NOTE - IV ALTEPLASE EXCL REL HIDDEN
Speech-Language Pathology    Adult Inpatient Swallow Treatment    Patient Name: Robert Luna  MRN: [id number]  : 1964  Today's Date: 25   Time Calculation  Start Time: 906  Stop Time: 925  Time Calculation (min): 19 min       Impression:   Swallow treatment completed. Patient seen to determine readiness for MBSS. Airvo present at 55% with 50L. Patient fully alert and conversant at time of visit. HOB elevated with patient self regulating ice chips via tsp. Patient consuming multiple ice chips. Bolus formation slowed with spontaneous reswallows completed. No overt s/s aspiration identified with boluses consumed. Patient eager to begin eating/drinking. MD present and providing medical clearance to proceed with MBSS. Will plan to complete MBSS pending patient condition. Radiology department notified.      Recommendations:  -NPO WITH MODIFIED BARIUM SWALLOW STUDY  -Single ice chips permitted (3-5 per hour) as tolerated     Short Term Goal:   Patient will tolerate current diet without overt s/s aspiration on 100% of therapeutic trials.    Long Term Goal:  Patient will resume/maintain oral intake in order to promote optimal oropharyngeal swallow function and reduce risk for dehydration, malnutrition and weight loss.     Start Date: 25  End Date: 25  Status: Progressing     Plan:  SLP Services Indicated: Yes  Frequency: 3x week  Discussed POC with patient  SLP - OK to Discharge? : No    Pain:   Patient exhibits no s/s of pain or discomfort during session     Inpatient Education:  Extensive education provided to patient regarding current swallow function, recommendations/results, and POC.      Consultations/Referrals/Coordination of Services:   N/A              show

## 2025-08-11 ENCOUNTER — EMERGENCY (EMERGENCY)
Facility: HOSPITAL | Age: 80
LOS: 1 days | End: 2025-08-11
Attending: EMERGENCY MEDICINE | Admitting: EMERGENCY MEDICINE
Payer: MEDICARE

## 2025-08-11 VITALS
SYSTOLIC BLOOD PRESSURE: 150 MMHG | RESPIRATION RATE: 18 BRPM | HEART RATE: 71 BPM | OXYGEN SATURATION: 100 % | DIASTOLIC BLOOD PRESSURE: 70 MMHG | TEMPERATURE: 97 F

## 2025-08-11 VITALS
DIASTOLIC BLOOD PRESSURE: 74 MMHG | TEMPERATURE: 98 F | RESPIRATION RATE: 18 BRPM | HEART RATE: 68 BPM | SYSTOLIC BLOOD PRESSURE: 150 MMHG | OXYGEN SATURATION: 98 %

## 2025-08-11 PROBLEM — I10 ESSENTIAL (PRIMARY) HYPERTENSION: Chronic | Status: ACTIVE | Noted: 2025-01-04

## 2025-08-11 LAB
ANION GAP SERPL CALC-SCNC: 10 MMOL/L — SIGNIFICANT CHANGE UP (ref 5–17)
BUN SERPL-MCNC: 17 MG/DL — SIGNIFICANT CHANGE UP (ref 7–23)
CALCIUM SERPL-MCNC: 8.8 MG/DL — SIGNIFICANT CHANGE UP (ref 8.4–10.5)
CHLORIDE SERPL-SCNC: 97 MMOL/L — SIGNIFICANT CHANGE UP (ref 96–108)
CO2 SERPL-SCNC: 24 MMOL/L — SIGNIFICANT CHANGE UP (ref 22–31)
CREAT SERPL-MCNC: 0.76 MG/DL — SIGNIFICANT CHANGE UP (ref 0.5–1.3)
EGFR: 91 ML/MIN/1.73M2 — SIGNIFICANT CHANGE UP
EGFR: 91 ML/MIN/1.73M2 — SIGNIFICANT CHANGE UP
GLUCOSE SERPL-MCNC: 212 MG/DL — HIGH (ref 70–99)
HCT VFR BLD CALC: 35.5 % — LOW (ref 39–50)
HGB BLD-MCNC: 11.9 G/DL — LOW (ref 13–17)
MCHC RBC-ENTMCNC: 33.5 G/DL — SIGNIFICANT CHANGE UP (ref 32–36)
MCHC RBC-ENTMCNC: 34.1 PG — HIGH (ref 27–34)
MCV RBC AUTO: 101.7 FL — HIGH (ref 80–100)
NRBC # BLD AUTO: 0 K/UL — SIGNIFICANT CHANGE UP (ref 0–0)
NRBC # FLD: 0 K/UL — SIGNIFICANT CHANGE UP (ref 0–0)
NRBC BLD AUTO-RTO: 0 /100 WBCS — SIGNIFICANT CHANGE UP (ref 0–0)
PLATELET # BLD AUTO: 125 K/UL — LOW (ref 150–400)
PMV BLD: 9.9 FL — SIGNIFICANT CHANGE UP (ref 7–13)
POTASSIUM SERPL-MCNC: 4 MMOL/L — SIGNIFICANT CHANGE UP (ref 3.5–5.3)
POTASSIUM SERPL-SCNC: 4 MMOL/L — SIGNIFICANT CHANGE UP (ref 3.5–5.3)
RBC # BLD: 3.49 M/UL — LOW (ref 4.2–5.8)
RBC # FLD: 13 % — SIGNIFICANT CHANGE UP (ref 10.3–14.5)
SODIUM SERPL-SCNC: 131 MMOL/L — LOW (ref 135–145)
TROPONIN T, HIGH SENSITIVITY RESULT: 11 NG/L — SIGNIFICANT CHANGE UP (ref 0–51)
TROPONIN T, HIGH SENSITIVITY RESULT: 13 NG/L — SIGNIFICANT CHANGE UP (ref 0–51)
WBC # BLD: 3.86 K/UL — SIGNIFICANT CHANGE UP (ref 3.8–10.5)
WBC # FLD AUTO: 3.86 K/UL — SIGNIFICANT CHANGE UP (ref 3.8–10.5)

## 2025-08-11 PROCEDURE — 85027 COMPLETE CBC AUTOMATED: CPT

## 2025-08-11 PROCEDURE — 70450 CT HEAD/BRAIN W/O DYE: CPT | Mod: 26

## 2025-08-11 PROCEDURE — 99284 EMERGENCY DEPT VISIT MOD MDM: CPT | Mod: 25

## 2025-08-11 PROCEDURE — 99285 EMERGENCY DEPT VISIT HI MDM: CPT

## 2025-08-11 PROCEDURE — 80048 BASIC METABOLIC PNL TOTAL CA: CPT

## 2025-08-11 PROCEDURE — 36415 COLL VENOUS BLD VENIPUNCTURE: CPT

## 2025-08-11 PROCEDURE — 84484 ASSAY OF TROPONIN QUANT: CPT

## 2025-08-11 PROCEDURE — 70450 CT HEAD/BRAIN W/O DYE: CPT

## 2025-08-11 PROCEDURE — 82962 GLUCOSE BLOOD TEST: CPT

## 2025-08-13 DIAGNOSIS — H40.9 UNSPECIFIED GLAUCOMA: ICD-10-CM

## 2025-08-13 DIAGNOSIS — J45.909 UNSPECIFIED ASTHMA, UNCOMPLICATED: ICD-10-CM

## 2025-08-13 DIAGNOSIS — F79 UNSPECIFIED INTELLECTUAL DISABILITIES: ICD-10-CM

## 2025-08-13 DIAGNOSIS — R55 SYNCOPE AND COLLAPSE: ICD-10-CM

## 2025-08-13 DIAGNOSIS — N40.0 BENIGN PROSTATIC HYPERPLASIA WITHOUT LOWER URINARY TRACT SYMPTOMS: ICD-10-CM

## 2025-08-13 DIAGNOSIS — Z79.82 LONG TERM (CURRENT) USE OF ASPIRIN: ICD-10-CM
